# Patient Record
Sex: FEMALE | Race: WHITE | Employment: FULL TIME | URBAN - METROPOLITAN AREA
[De-identification: names, ages, dates, MRNs, and addresses within clinical notes are randomized per-mention and may not be internally consistent; named-entity substitution may affect disease eponyms.]

---

## 2017-01-03 ENCOUNTER — OFFICE VISIT (OUTPATIENT)
Dept: PHYSICAL THERAPY | Age: 47
End: 2017-01-03
Attending: ORTHOPAEDIC SURGERY
Payer: COMMERCIAL

## 2017-01-03 ENCOUNTER — TELEPHONE (OUTPATIENT)
Dept: PHYSICAL THERAPY | Age: 47
End: 2017-01-03

## 2017-01-03 PROCEDURE — 97110 THERAPEUTIC EXERCISES: CPT

## 2017-01-03 NOTE — PROGRESS NOTES
Dx: L ACL repair          Authorized # of Visits:  3        Next MD visit: none scheduled  Fall Risk: standard         Precautions: n/a             Subjective: patient reports less  skin tenderness over the brace.  She reports no more pain in her knee with

## 2017-01-05 ENCOUNTER — APPOINTMENT (OUTPATIENT)
Dept: PHYSICAL THERAPY | Age: 47
End: 2017-01-05
Attending: ORTHOPAEDIC SURGERY
Payer: COMMERCIAL

## 2017-01-05 ENCOUNTER — TELEPHONE (OUTPATIENT)
Dept: INTERNAL MEDICINE CLINIC | Facility: CLINIC | Age: 47
End: 2017-01-05

## 2017-01-05 ENCOUNTER — OFFICE VISIT (OUTPATIENT)
Dept: PHYSICAL THERAPY | Age: 47
End: 2017-01-05
Attending: ORTHOPAEDIC SURGERY
Payer: COMMERCIAL

## 2017-01-05 DIAGNOSIS — R35.0 URINE FREQUENCY: ICD-10-CM

## 2017-01-05 DIAGNOSIS — R30.9 PAINFUL URINATION: Primary | ICD-10-CM

## 2017-01-05 PROCEDURE — 97110 THERAPEUTIC EXERCISES: CPT

## 2017-01-05 NOTE — TELEPHONE ENCOUNTER
Patient stated for 3 To 4 days she has been having urinary frequency, painful but not burning urination, cloudy and foul smelling urine. Recently has ACLS surgery. Denies urgency, blood in the urine, back pain or pubic pressure. LOV 1/12/16.    Patient

## 2017-01-05 NOTE — PROGRESS NOTES
Dx: L ACL repair          Authorized # of Visits:  4        Next MD visit: none scheduled  Fall Risk: standard         Precautions: n/a             Subjective: patient reports that her knee feels stiff today but denies pain .   Pain level: 0/10  Objective:

## 2017-01-05 NOTE — TELEPHONE ENCOUNTER
Pt states that she has a urinary tract infection. Per pt she has pain when urinating and odor in the urine. Pt would like to know if doctor can prescribe a mediation to her or what does doctor recommend.  Pt will wait until she hears back from RN before charis

## 2017-01-06 ENCOUNTER — APPOINTMENT (OUTPATIENT)
Dept: LAB | Age: 47
End: 2017-01-06
Attending: INTERNAL MEDICINE
Payer: COMMERCIAL

## 2017-01-06 DIAGNOSIS — R35.0 URINE FREQUENCY: ICD-10-CM

## 2017-01-06 DIAGNOSIS — R30.9 PAINFUL URINATION: ICD-10-CM

## 2017-01-06 LAB
BILIRUB UR QL: NEGATIVE
COLOR UR: YELLOW
GLUCOSE UR-MCNC: NEGATIVE MG/DL
HGB UR QL STRIP.AUTO: NEGATIVE
NITRITE UR QL STRIP.AUTO: NEGATIVE
PH UR: 5 [PH] (ref 5–8)
PROT UR-MCNC: 30 MG/DL
RBC #/AREA URNS AUTO: 2 /HPF
SP GR UR STRIP: 1.03 (ref 1–1.03)
UROBILINOGEN UR STRIP-ACNC: <2
VIT C UR-MCNC: 40 MG/DL
WBC #/AREA URNS AUTO: 11 /HPF

## 2017-01-06 PROCEDURE — 87086 URINE CULTURE/COLONY COUNT: CPT

## 2017-01-06 PROCEDURE — 87186 SC STD MICRODIL/AGAR DIL: CPT

## 2017-01-06 PROCEDURE — 81001 URINALYSIS AUTO W/SCOPE: CPT

## 2017-01-06 PROCEDURE — 87077 CULTURE AEROBIC IDENTIFY: CPT

## 2017-01-07 RX ORDER — NITROFURANTOIN 25; 75 MG/1; MG/1
100 CAPSULE ORAL 2 TIMES DAILY
Qty: 14 CAPSULE | Refills: 0 | Status: SHIPPED | OUTPATIENT
Start: 2017-01-07 | End: 2017-01-14

## 2017-01-07 NOTE — TELEPHONE ENCOUNTER
Please call pt that u/a is  minimlly  bnormal , culture is pending we can start her on Macrobid 100 ng  Every 12 hours  For 7 days , today later or tomorrow culture  Will be  Resulted if  Antibiotic needs to be changed  We will let her know

## 2017-01-10 ENCOUNTER — OFFICE VISIT (OUTPATIENT)
Dept: PHYSICAL THERAPY | Age: 47
End: 2017-01-10
Attending: ORTHOPAEDIC SURGERY
Payer: COMMERCIAL

## 2017-01-10 PROCEDURE — 97110 THERAPEUTIC EXERCISES: CPT

## 2017-01-10 NOTE — PROGRESS NOTES
Dx: L ACL repair   ( 12/ 21/ 16 )       Authorized # of Visits:  4        Next MD visit: none scheduled  Fall Risk: standard         Precautions: n/a             Subjective: patient reports that her knee feels stiff today but denies pain .   Pain level: 0/1

## 2017-01-12 ENCOUNTER — OFFICE VISIT (OUTPATIENT)
Dept: INTERNAL MEDICINE CLINIC | Facility: CLINIC | Age: 47
End: 2017-01-12

## 2017-01-12 ENCOUNTER — OFFICE VISIT (OUTPATIENT)
Dept: PHYSICAL THERAPY | Age: 47
End: 2017-01-12
Attending: ORTHOPAEDIC SURGERY
Payer: COMMERCIAL

## 2017-01-12 ENCOUNTER — HOSPITAL ENCOUNTER (OUTPATIENT)
Dept: MAMMOGRAPHY | Age: 47
Discharge: HOME | End: 2017-01-12
Attending: INTERNAL MEDICINE
Payer: COMMERCIAL

## 2017-01-12 VITALS
RESPIRATION RATE: 18 BRPM | HEART RATE: 72 BPM | WEIGHT: 220 LBS | TEMPERATURE: 98 F | BODY MASS INDEX: 31.5 KG/M2 | DIASTOLIC BLOOD PRESSURE: 78 MMHG | SYSTOLIC BLOOD PRESSURE: 126 MMHG | HEIGHT: 70 IN

## 2017-01-12 DIAGNOSIS — Z12.31 ENCOUNTER FOR SCREENING MAMMOGRAM FOR MALIGNANT NEOPLASM OF BREAST: ICD-10-CM

## 2017-01-12 DIAGNOSIS — R05.9 COUGH: ICD-10-CM

## 2017-01-12 DIAGNOSIS — S39.012A LUMBAR STRAIN, INITIAL ENCOUNTER: ICD-10-CM

## 2017-01-12 DIAGNOSIS — Z00.00 PHYSICAL EXAM: Primary | ICD-10-CM

## 2017-01-12 PROCEDURE — 97110 THERAPEUTIC EXERCISES: CPT

## 2017-01-12 PROCEDURE — 99396 PREV VISIT EST AGE 40-64: CPT | Performed by: INTERNAL MEDICINE

## 2017-01-12 PROCEDURE — 77067 SCR MAMMO BI INCL CAD: CPT

## 2017-01-12 RX ORDER — METHOCARBAMOL 750 MG/1
750 TABLET, FILM COATED ORAL 3 TIMES DAILY
Qty: 60 TABLET | Refills: 0 | Status: SHIPPED | OUTPATIENT
Start: 2017-01-12 | End: 2017-12-14 | Stop reason: ALTCHOICE

## 2017-01-12 NOTE — PROGRESS NOTES
Dx: L ACL repair   ( 12/ 21/ 16 )       Authorized # of Visits:  5        Next MD visit:  January 24th  Fall Risk: standard         Precautions: n/a             Subjective: Pt reports woke up Wednesday morning with a lot of back soreness.     Pain level: 0/

## 2017-01-13 PROBLEM — S39.012A LUMBAR STRAIN: Status: ACTIVE | Noted: 2017-01-13

## 2017-01-13 NOTE — PROGRESS NOTES
HPI:    Patient ID: Sumaya Granado is a 42year old female presents for physical exam    HPI  Patient reports that she is recovering after left knee reconstructive surgery she tore ACL ligament.   She is undergoing physical therapy and still not working, h External Ointment APPLY TO AFFECTED  AREA  DAILY Disp: 30 g Rfl: 0   fluticasone-salmeterol (ADVAIR DISKUS) 250-50 MCG/DOSE Inhalation Aerosol Powder, Breath Activated Inhale 1 puff into the lungs every 12 (twelve) hours.  Disp:  Rfl:    TRINESSA, 28, 0.18/ motor skills appropriate for age         ASSESSMENT/PLAN:   Physical exam  (primary encounter diagnosis) discussed importance of healthy diet, regular physical activities when she is able to return to, she will check with school and see if she is in need o

## 2017-01-16 ENCOUNTER — OFFICE VISIT (OUTPATIENT)
Dept: PHYSICAL THERAPY | Age: 47
End: 2017-01-16
Attending: ORTHOPAEDIC SURGERY
Payer: COMMERCIAL

## 2017-01-16 PROCEDURE — 97110 THERAPEUTIC EXERCISES: CPT

## 2017-01-17 ENCOUNTER — APPOINTMENT (OUTPATIENT)
Dept: PHYSICAL THERAPY | Age: 47
End: 2017-01-17
Attending: ORTHOPAEDIC SURGERY
Payer: COMMERCIAL

## 2017-01-18 ENCOUNTER — OFFICE VISIT (OUTPATIENT)
Dept: PHYSICAL THERAPY | Age: 47
End: 2017-01-18
Attending: ORTHOPAEDIC SURGERY
Payer: COMMERCIAL

## 2017-01-18 PROCEDURE — 97110 THERAPEUTIC EXERCISES: CPT

## 2017-01-18 NOTE — PROGRESS NOTES
Dx:  L ACL repair   ( 12/ 21/ 16 )       Authorized # of Visits:  6        Next MD visit:  January 24th  Fall Risk: standard         Precautions: n/a             Subjective: Pt reports that she returned back to work this week and her knee feels sore and sti

## 2017-01-19 ENCOUNTER — APPOINTMENT (OUTPATIENT)
Dept: PHYSICAL THERAPY | Age: 47
End: 2017-01-19
Attending: ORTHOPAEDIC SURGERY
Payer: COMMERCIAL

## 2017-01-23 ENCOUNTER — OFFICE VISIT (OUTPATIENT)
Dept: PHYSICAL THERAPY | Age: 47
End: 2017-01-23
Attending: ORTHOPAEDIC SURGERY
Payer: COMMERCIAL

## 2017-01-23 PROCEDURE — 97110 THERAPEUTIC EXERCISES: CPT

## 2017-01-24 ENCOUNTER — OFFICE VISIT (OUTPATIENT)
Dept: ORTHOPEDICS CLINIC | Facility: CLINIC | Age: 47
End: 2017-01-24

## 2017-01-24 DIAGNOSIS — S83.512A LEFT ACL TEAR: Primary | ICD-10-CM

## 2017-01-24 DIAGNOSIS — S83.242A ACUTE MEDIAL MENISCUS TEAR, LEFT, INITIAL ENCOUNTER: ICD-10-CM

## 2017-01-24 DIAGNOSIS — S83.282A ACUTE LATERAL MENISCUS TEAR OF LEFT KNEE, INITIAL ENCOUNTER: ICD-10-CM

## 2017-01-24 PROCEDURE — 99212 OFFICE O/P EST SF 10 MIN: CPT | Performed by: ORTHOPAEDIC SURGERY

## 2017-01-24 PROCEDURE — 99024 POSTOP FOLLOW-UP VISIT: CPT | Performed by: ORTHOPAEDIC SURGERY

## 2017-01-24 NOTE — PROGRESS NOTES
This is a pleasant 44-year-old female that is 4 weeks status post left knee ACL reconstruction, medial meniscus repair and partial lateral meniscectomy. The patient has had no fevers, chills, or markers of infection.   The patient has been participating in

## 2017-01-24 NOTE — PROGRESS NOTES
Patient Name: Leeroy Garcia, : 3/1/1970, MRN: [id number]   Date:  2017  Referring Physician:  Katie Jerry    Diagnosis: Left ACL tear (G66.437C)  S/P left knee arthroscopy (M82.464)  Acute medial meniscus tear, left, initial encounter ( min restrict  Quads: R: NT restrict; L: NT restrict  Gastroc: R: min restrict; L: mod restrict  Soleus: R: min restrict; L: mod restrict    Strength/MMT: **Knee strength NT secondary to post-op  Hip  (in brace) Knee  Foot/Ankle    Flexion: R 5/5; L 4-/5  E stability with stance phase of ambulation-IMPROVED (met with brace)            Rehab Potential: excellent    Plan: Continue skilled Physical Therapy 2 x/week or a total of 10 visits over a 90 day period.  Treatment will include: therapeutic exercises, thera

## 2017-01-26 ENCOUNTER — OFFICE VISIT (OUTPATIENT)
Dept: PHYSICAL THERAPY | Age: 47
End: 2017-01-26
Attending: ORTHOPAEDIC SURGERY
Payer: COMMERCIAL

## 2017-01-26 PROCEDURE — 97110 THERAPEUTIC EXERCISES: CPT

## 2017-01-27 NOTE — PROGRESS NOTES
Dx:  L ACL repair   ( 12/ 21/ 16 )       Authorized # of Visits:  6        Next MD visit:  January 24th  Fall Risk: standard         Precautions: n/a             Subjective: Pt reports saw MD and had brace unlocked, but brace isn't fitting right; keeps fall

## 2017-01-30 ENCOUNTER — OFFICE VISIT (OUTPATIENT)
Dept: PHYSICAL THERAPY | Age: 47
End: 2017-01-30
Attending: ORTHOPAEDIC SURGERY
Payer: COMMERCIAL

## 2017-01-30 PROCEDURE — 97110 THERAPEUTIC EXERCISES: CPT

## 2017-01-31 NOTE — PROGRESS NOTES
Dx: L ACL repair   ( 12/ 21/ 16 )       Authorized # of Visits:  7        Next MD visit:  January 24th  Fall Risk: standard         Precautions: n/a             Subjective: Pt reports talked to MD who said to stop wearing brace since wasn't fitting right.

## 2017-02-02 ENCOUNTER — OFFICE VISIT (OUTPATIENT)
Dept: PHYSICAL THERAPY | Age: 47
End: 2017-02-02
Attending: ORTHOPAEDIC SURGERY
Payer: COMMERCIAL

## 2017-02-02 PROCEDURE — 97110 THERAPEUTIC EXERCISES: CPT

## 2017-02-03 NOTE — PROGRESS NOTES
Dx: L ACL repair   ( 12/ 21/ 16 )       Authorized # of Visits:  8        Next MD visit:  January 24th  Fall Risk: standard         Precautions: n/a             Subjective: Pt reports no pain today.  States knee was stiff getting out of the car; will feel s

## 2017-02-07 ENCOUNTER — APPOINTMENT (OUTPATIENT)
Dept: PHYSICAL THERAPY | Age: 47
End: 2017-02-07
Attending: INTERNAL MEDICINE
Payer: COMMERCIAL

## 2017-02-10 ENCOUNTER — OFFICE VISIT (OUTPATIENT)
Dept: PHYSICAL THERAPY | Age: 47
End: 2017-02-10
Attending: ORTHOPAEDIC SURGERY
Payer: COMMERCIAL

## 2017-02-10 PROCEDURE — 97110 THERAPEUTIC EXERCISES: CPT

## 2017-02-10 NOTE — PROGRESS NOTES
Dx: L ACL repair   ( 12/ 21/ 16 )       Authorized # of Visits:  9        Next MD visit:  January 24th  Fall Risk: standard         Precautions: n/a             Subjective: Pt reports knee feels stiff when straightening it first thing in the morning.  Every

## 2017-02-14 ENCOUNTER — OFFICE VISIT (OUTPATIENT)
Dept: PHYSICAL THERAPY | Age: 47
End: 2017-02-14
Attending: INTERNAL MEDICINE
Payer: COMMERCIAL

## 2017-02-14 PROCEDURE — 97110 THERAPEUTIC EXERCISES: CPT

## 2017-02-15 NOTE — PROGRESS NOTES
Dx:  L ACL repair   ( 12/ 21/ 16 )       Authorized # of Visits:  10        Next MD visit:  January 24th  Fall Risk: standard         Precautions: n/a             Subjective: Pt reports that her knee feels much better overall but sometimes feels weak with a

## 2017-02-16 ENCOUNTER — OFFICE VISIT (OUTPATIENT)
Dept: PHYSICAL THERAPY | Age: 47
End: 2017-02-16
Attending: INTERNAL MEDICINE
Payer: COMMERCIAL

## 2017-02-16 PROCEDURE — 97110 THERAPEUTIC EXERCISES: CPT

## 2017-02-20 ENCOUNTER — OFFICE VISIT (OUTPATIENT)
Dept: PHYSICAL THERAPY | Age: 47
End: 2017-02-20
Attending: INTERNAL MEDICINE
Payer: COMMERCIAL

## 2017-02-20 PROCEDURE — 97110 THERAPEUTIC EXERCISES: CPT

## 2017-02-21 NOTE — PROGRESS NOTES
Dx: L ACL repair   ( 12/ 21/ 16 )       Authorized # of Visits:  12        Next MD visit:  January 24th  Fall Risk: standard         Precautions: n/a             Subjective: Pt reports knee has been feeling a little tight, but otherwise is good.    Pain lev

## 2017-02-23 ENCOUNTER — OFFICE VISIT (OUTPATIENT)
Dept: PHYSICAL THERAPY | Age: 47
End: 2017-02-23
Attending: INTERNAL MEDICINE
Payer: COMMERCIAL

## 2017-02-23 PROCEDURE — 97110 THERAPEUTIC EXERCISES: CPT

## 2017-02-24 NOTE — PROGRESS NOTES
Dx: L ACL repair   ( 12/ 21/ 16 )       Authorized # of Visits:  13/       Next MD visit:  January 24th  Fall Risk: standard         Precautions: n/a             Subjective: Pt reports almost tripped 3 times today over her pants but no injury to knee.  Stat

## 2017-02-27 ENCOUNTER — OFFICE VISIT (OUTPATIENT)
Dept: PHYSICAL THERAPY | Age: 47
End: 2017-02-27
Attending: INTERNAL MEDICINE
Payer: COMMERCIAL

## 2017-02-27 PROCEDURE — 97110 THERAPEUTIC EXERCISES: CPT

## 2017-02-27 NOTE — PROGRESS NOTES
Dx:  L ACL repair   ( 12/ 21/ 16 )       Authorized # of Visits:  18/20 (PPO)      Next MD visit:  January 24th  Fall Risk: standard         Precautions: n/a             Subjective: Pt reports almost tripped over a kid today and felt some ache, but is ok no

## 2017-03-02 ENCOUNTER — OFFICE VISIT (OUTPATIENT)
Dept: PHYSICAL THERAPY | Age: 47
End: 2017-03-02
Attending: INTERNAL MEDICINE
Payer: COMMERCIAL

## 2017-03-02 PROCEDURE — 97110 THERAPEUTIC EXERCISES: CPT

## 2017-03-02 PROCEDURE — 97112 NEUROMUSCULAR REEDUCATION: CPT

## 2017-03-03 NOTE — PROGRESS NOTES
Patient Name: Isabell Barahona, : 3/1/1970, MRN: [id number]   Date:  3/2/2017  Referring Physician: Fernando Nichole    Diagnosis: Left ACL tear (N66.693L)  S/P left knee arthroscopy (A83.765)  Acute medial meniscus tear, left, initial encounter (S83 Flexion: R 5/5; L 4+/5  Extension: R 5/5; L 4/5  Abduction: R 5/5; L 4+/5   Glut Med:  R: 4+/5; L ;4-/5 Flexion: R 5/5; L 4/5  Extension: R 5/5; L 5/5      DF: R 5/5; L 5/5  PF: R 5/5; L 4/5  INV: R 5/5; L 5/5  EV: R 5/5; L 5/5         Balance (firm surf co-sign or sign and return this letter via fax as soon as possible to [phone]. If you have any questions, please contact me at Dept: [phone].     Sincerely,  Alexus Hillman    Electronically signed by therapist: Alexus Hillman, PT,DPT,

## 2017-03-08 ENCOUNTER — OFFICE VISIT (OUTPATIENT)
Dept: PHYSICAL THERAPY | Age: 47
End: 2017-03-08
Attending: INTERNAL MEDICINE
Payer: COMMERCIAL

## 2017-03-08 PROCEDURE — 97110 THERAPEUTIC EXERCISES: CPT

## 2017-03-08 NOTE — PROGRESS NOTES
Dx:  L ACL repair   ( 12/ 21/ 16 )       Authorized # of Visits:  20/27 (PPO)      Next MD visit: none scheduled  Fall Risk: standard         Precautions: n/a             Subjective: Pt reports L leg has been tired, she believes from doing stairs more often

## 2017-03-13 ENCOUNTER — OFFICE VISIT (OUTPATIENT)
Dept: PHYSICAL THERAPY | Age: 47
End: 2017-03-13
Attending: INTERNAL MEDICINE
Payer: COMMERCIAL

## 2017-03-13 PROCEDURE — 97110 THERAPEUTIC EXERCISES: CPT

## 2017-03-13 NOTE — PROGRESS NOTES
Dx: L ACL repair   ( 12/ 21/ 16 )       Authorized # of Visits:  21/27 (PPO)      Next MD visit: none scheduled  Fall Risk: standard         Precautions: n/a             Subjective: Pt reports still feels like knee is unstable when she walks sometimes.  Has

## 2017-03-16 ENCOUNTER — OFFICE VISIT (OUTPATIENT)
Dept: PHYSICAL THERAPY | Age: 47
End: 2017-03-16
Attending: INTERNAL MEDICINE
Payer: COMMERCIAL

## 2017-03-16 PROCEDURE — 97110 THERAPEUTIC EXERCISES: CPT

## 2017-03-16 NOTE — PROGRESS NOTES
Dx: L ACL repair   ( 12/ 21/ 16 )       Authorized # of Visits:  22/27 (PPO)      Next MD visit: none scheduled  Fall Risk: standard         Precautions: n/a             Subjective: Pt reports that her knee feels tired and denies any pain today.   Pain leve

## 2017-03-21 ENCOUNTER — OFFICE VISIT (OUTPATIENT)
Dept: PHYSICAL THERAPY | Age: 47
End: 2017-03-21
Attending: INTERNAL MEDICINE
Payer: COMMERCIAL

## 2017-03-21 PROCEDURE — 97110 THERAPEUTIC EXERCISES: CPT

## 2017-03-21 NOTE — PROGRESS NOTES
Dx:  L ACL repair   ( 12/ 21/ 16 )       Authorized # of Visits:  23/27 (PPO)      Next MD visit: none scheduled  Fall Risk: standard         Precautions: n/a             Subjective: Pt reports that she still has some difficulties with stairs negotiation du

## 2017-03-23 ENCOUNTER — OFFICE VISIT (OUTPATIENT)
Dept: PHYSICAL THERAPY | Age: 47
End: 2017-03-23
Attending: INTERNAL MEDICINE
Payer: COMMERCIAL

## 2017-03-23 PROCEDURE — 97110 THERAPEUTIC EXERCISES: CPT

## 2017-03-23 NOTE — PROGRESS NOTES
Dx: L ACL repair   ( 12/ 21/ 16 )       Authorized # of Visits:  24/27 (PPO)      Next MD visit: none scheduled  Fall Risk: standard         Precautions: n/a             Subjective: Pt reports stairs are intermittently ok.  Sometimes she will feel ok going

## 2017-03-27 ENCOUNTER — OFFICE VISIT (OUTPATIENT)
Dept: PHYSICAL THERAPY | Age: 47
End: 2017-03-27
Attending: INTERNAL MEDICINE
Payer: COMMERCIAL

## 2017-03-27 PROCEDURE — 97110 THERAPEUTIC EXERCISES: CPT

## 2017-03-27 NOTE — PROGRESS NOTES
Dx: L ACL repair   ( 12/ 21/ 16 )       Authorized # of Visits:  25/27 (PPO)      Next MD visit: none scheduled  Fall Risk: standard         Precautions: n/a             Subjective: Pt reports reports knee feeling good.  Feels sore with activity, but \"good

## 2017-03-30 ENCOUNTER — OFFICE VISIT (OUTPATIENT)
Dept: PHYSICAL THERAPY | Age: 47
End: 2017-03-30
Attending: INTERNAL MEDICINE
Payer: COMMERCIAL

## 2017-03-30 PROCEDURE — 97110 THERAPEUTIC EXERCISES: CPT

## 2017-03-30 NOTE — PROGRESS NOTES
Patient Name: Cullen June, : 3/1/1970, MRN: [id number]   Date:  3/30/2017  Referring Physician:  Billie Hughes    Diagnosis: L ACL repair   (  )     Progress Summary    Pt has attended 25 visits in Physical Therapy.      Progress No Treatment:  Recumbent bike lvl 3 x5mins  Supine: SLR with 3# 3x10x5\"hold L  Sidely SLR with ER 3x10x5\"hold  Shuttle: B leg press 8B 3x10  Shuttle L leg press 5B 3x10  Sidesteps GTB 2 laps ea dir  MW GTB 2 laps ea dir  SL heel raise off edge of step 3x10 [phone].     Sincerely,  Anastasiia Harrison    Electronically signed by therapist: Anastasiia Harrison, PT,DPT,BSPTS [address] [facility]    Physician's certification required: Yes  I certify the need for these services furnished under this plan of treatmen

## 2017-04-03 ENCOUNTER — APPOINTMENT (OUTPATIENT)
Dept: PHYSICAL THERAPY | Age: 47
End: 2017-04-03
Attending: INTERNAL MEDICINE
Payer: COMMERCIAL

## 2017-04-06 ENCOUNTER — OFFICE VISIT (OUTPATIENT)
Dept: PHYSICAL THERAPY | Age: 47
End: 2017-04-06
Attending: INTERNAL MEDICINE
Payer: COMMERCIAL

## 2017-04-06 PROCEDURE — 97110 THERAPEUTIC EXERCISES: CPT

## 2017-04-06 NOTE — PROGRESS NOTES
Dx:  L ACL repair   ( 12/ 21/ 16 )       Authorized # of Visits:  26/34 (PPO)      Next MD visit: none scheduled  Fall Risk: standard         Precautions: n/a             Subjective: Pt reports right leg was tight so was forced to use left leg a lot more fo

## 2017-04-10 ENCOUNTER — APPOINTMENT (OUTPATIENT)
Dept: PHYSICAL THERAPY | Age: 47
End: 2017-04-10
Attending: INTERNAL MEDICINE
Payer: COMMERCIAL

## 2017-04-11 ENCOUNTER — OFFICE VISIT (OUTPATIENT)
Dept: PHYSICAL THERAPY | Age: 47
End: 2017-04-11
Attending: INTERNAL MEDICINE
Payer: COMMERCIAL

## 2017-04-11 ENCOUNTER — OFFICE VISIT (OUTPATIENT)
Dept: ORTHOPEDICS CLINIC | Facility: CLINIC | Age: 47
End: 2017-04-11

## 2017-04-11 DIAGNOSIS — S83.512A LEFT ACL TEAR: Primary | ICD-10-CM

## 2017-04-11 DIAGNOSIS — S83.282A ACUTE LATERAL MENISCUS TEAR OF LEFT KNEE, INITIAL ENCOUNTER: ICD-10-CM

## 2017-04-11 DIAGNOSIS — S83.242A ACUTE MEDIAL MENISCUS TEAR, LEFT, INITIAL ENCOUNTER: ICD-10-CM

## 2017-04-11 PROCEDURE — 99213 OFFICE O/P EST LOW 20 MIN: CPT | Performed by: ORTHOPAEDIC SURGERY

## 2017-04-11 PROCEDURE — 99212 OFFICE O/P EST SF 10 MIN: CPT | Performed by: ORTHOPAEDIC SURGERY

## 2017-04-11 PROCEDURE — 97110 THERAPEUTIC EXERCISES: CPT

## 2017-04-11 NOTE — PROGRESS NOTES
Dx: L ACL repair   ( 12/ 21/ 16 )       Authorized # of Visits:  27/34 (PPO)      Next MD visit: none scheduled  Fall Risk: standard         Precautions: n/a             Subjective: Pt reports felt sore after last session, but not bad.  Has been focusing on

## 2017-04-11 NOTE — PROGRESS NOTES
4/11/2017  Mohit Cleora Both  11/1970  41year old   female  Ajith Faye MD    HPI:   Patient presents with:  Post-Op: s/p left ACL -- Going to PT. Denies pain, numbness and tingling. Denies calf pain, calf swelling or fever.  States going up and colette status post left knee ACL reconstruction, medial meniscus repair, and partial lateral meniscectomy. The patient will continue physical therapy. The patient will follow-up in 2 months for repeat evaluation.   The patient was given information on how to obt

## 2017-04-13 ENCOUNTER — APPOINTMENT (OUTPATIENT)
Dept: PHYSICAL THERAPY | Age: 47
End: 2017-04-13
Attending: INTERNAL MEDICINE
Payer: COMMERCIAL

## 2017-04-13 ENCOUNTER — OFFICE VISIT (OUTPATIENT)
Dept: PHYSICAL THERAPY | Age: 47
End: 2017-04-13
Attending: INTERNAL MEDICINE
Payer: COMMERCIAL

## 2017-04-13 PROCEDURE — 97110 THERAPEUTIC EXERCISES: CPT

## 2017-04-13 NOTE — PROGRESS NOTES
Dx: L ACL repair   ( 12/ 21/ 16 )       Authorized # of Visits:  28/34 (PPO)      Next MD visit: none scheduled  Fall Risk: standard         Precautions: n/a             Subjective: Pt reports knee feels ok.  Saw MD and he prescribed soft brace for when she

## 2017-04-19 ENCOUNTER — APPOINTMENT (OUTPATIENT)
Dept: PHYSICAL THERAPY | Age: 47
End: 2017-04-19
Attending: INTERNAL MEDICINE
Payer: COMMERCIAL

## 2017-04-27 ENCOUNTER — OFFICE VISIT (OUTPATIENT)
Dept: PHYSICAL THERAPY | Age: 47
End: 2017-04-27
Attending: INTERNAL MEDICINE
Payer: COMMERCIAL

## 2017-04-27 PROCEDURE — 97110 THERAPEUTIC EXERCISES: CPT

## 2017-04-27 NOTE — PROGRESS NOTES
Dx: L ACL repair   ( 12/ 21/ 16 )       Authorized # of Visits:  29/34 (PPO)      Next MD visit: June  Fall Risk: standard         Precautions: n/a             Subjective: Pt reports bought BOS[employer] and has been using it for balance exercises.  Was able to walk

## 2017-05-04 ENCOUNTER — OFFICE VISIT (OUTPATIENT)
Dept: PHYSICAL THERAPY | Age: 47
End: 2017-05-04
Attending: INTERNAL MEDICINE
Payer: COMMERCIAL

## 2017-05-04 PROCEDURE — 97110 THERAPEUTIC EXERCISES: CPT

## 2017-05-04 NOTE — PROGRESS NOTES
Dx: L ACL repair   ( 12/ 21/ 16 )       Authorized # of Visits:  30/34 (PPO)      Next MD visit: June  Fall Risk: standard         Precautions: n/a             Subjective: Pt reports no new c/o  And denies any pain today.   Pain level: 0/10   Objective:

## 2017-05-11 ENCOUNTER — OFFICE VISIT (OUTPATIENT)
Dept: PHYSICAL THERAPY | Age: 47
End: 2017-05-11
Attending: INTERNAL MEDICINE
Payer: COMMERCIAL

## 2017-05-11 PROCEDURE — 97110 THERAPEUTIC EXERCISES: CPT

## 2017-05-11 NOTE — PROGRESS NOTES
Dx: L ACL repair   ( 12/ 21/ 16 )       Authorized # of Visits:  31/34 (PPO)      Next MD visit: June  Fall Risk: standard         Precautions: n/a             Subjective: Pt reports  That she still has some difficulties with going down the stairs.    Pain

## 2017-05-18 ENCOUNTER — OFFICE VISIT (OUTPATIENT)
Dept: PHYSICAL THERAPY | Age: 47
End: 2017-05-18
Attending: INTERNAL MEDICINE
Payer: COMMERCIAL

## 2017-05-18 PROCEDURE — 97110 THERAPEUTIC EXERCISES: CPT

## 2017-05-18 NOTE — PROGRESS NOTES
Patient Name: Ed Viramontes, : 3/1/1970, MRN: [id number]   Date:  2017  Referring Physician:  Ilya Gonsalez    Diagnosis: L ACL repair   (  )    Progress Summary    Pt has attended 32 visits in Physical Therapy.      Progress Not 5/5  Extension: R 5/5; L 5/5*        DF: R 5/5; L 5/5  PF: R 5/5; L 4+/5  INV: R 5/5; L 5/5  EV: R 5/5; L 5/5                                                 Today's Treatment:  Recumbent bike lvl 4 x 5 mins  Transfer floor <> stand focus on push-off  Fwd please contact me at Dept: [phone].     Sincerely,  Anastasiia Harrison    Electronically signed by therapist: Anastasiia Harrison, PT,ENRRIQUE,Felicity Cert    [de-identified] certification required: Yes  I certify the need for these services furnished under

## 2017-05-25 ENCOUNTER — OFFICE VISIT (OUTPATIENT)
Dept: PHYSICAL THERAPY | Age: 47
End: 2017-05-25
Attending: INTERNAL MEDICINE
Payer: COMMERCIAL

## 2017-05-25 PROCEDURE — 97110 THERAPEUTIC EXERCISES: CPT

## 2017-05-25 NOTE — PROGRESS NOTES
Dx: L ACL repair   ( 12/ 21/ 16 )       Authorized # of Visits:  33/40 (PPO)      Next MD visit: June  Fall Risk: standard         Precautions: n/a             Subjective: Pt reports no longer using \"rolling chair\" at work.  Is able to sit in small chair

## 2017-06-01 ENCOUNTER — OFFICE VISIT (OUTPATIENT)
Dept: PHYSICAL THERAPY | Age: 47
End: 2017-06-01
Attending: INTERNAL MEDICINE
Payer: COMMERCIAL

## 2017-06-01 PROCEDURE — 97110 THERAPEUTIC EXERCISES: CPT

## 2017-06-01 NOTE — PROGRESS NOTES
Dx: L ACL repair   ( 12/ 21/ 16 )       Authorized # of Visits:  34/40 (PPO)      Next MD visit: June  Fall Risk: standard         Precautions: n/a             Subjective: Pt reports knee feeling much better.  Is able to sit on stool at work and in kids' ch

## 2017-06-08 ENCOUNTER — OFFICE VISIT (OUTPATIENT)
Dept: PHYSICAL THERAPY | Age: 47
End: 2017-06-08
Attending: INTERNAL MEDICINE
Payer: COMMERCIAL

## 2017-06-08 PROCEDURE — 97110 THERAPEUTIC EXERCISES: CPT

## 2017-06-08 NOTE — PROGRESS NOTES
Dx: L ACL repair   ( 12/ 21/ 16 )       Authorized # of Visits:  35/40 (PPO)      Next MD visit:   Fall Risk: standard         Precautions: n/a             Subjective: Pt reports noticed a \"bump\" on incision this morning.  States looks better now, but not

## 2017-06-14 ENCOUNTER — TELEPHONE (OUTPATIENT)
Dept: PHYSICAL THERAPY | Age: 47
End: 2017-06-14

## 2017-06-14 NOTE — TELEPHONE ENCOUNTER
Pt cancelled last 2 scheduled appts on mychart. Called pt to discuss whether she would like to be discharged or reschedule appts. Left vm asking pt to return call.

## 2017-06-15 ENCOUNTER — APPOINTMENT (OUTPATIENT)
Dept: PHYSICAL THERAPY | Age: 47
End: 2017-06-15
Attending: INTERNAL MEDICINE
Payer: COMMERCIAL

## 2017-06-20 ENCOUNTER — TELEPHONE (OUTPATIENT)
Dept: INTERNAL MEDICINE CLINIC | Facility: CLINIC | Age: 47
End: 2017-06-20

## 2017-06-20 NOTE — TELEPHONE ENCOUNTER
NEEDS A LIST OF HER MEDICATIONS SHE IS TAKING AND THE LIST MUST INCLUDE DOCTOR'S SIGNATURE  ( SHE IS TRAVELING TO [place] AND THEY WILL NOT ALLOW ANY DRUGS INTO THERE COUNTRY WITH A DOCTOR'S NOTE )  SHE NEEDS THIS BEFORE Friday

## 2017-06-22 ENCOUNTER — APPOINTMENT (OUTPATIENT)
Dept: PHYSICAL THERAPY | Age: 47
End: 2017-06-22
Attending: INTERNAL MEDICINE
Payer: COMMERCIAL

## 2017-06-22 RX ORDER — NORGESTIMATE-ETHINYL ESTRADIOL 7DAYSX3 28
TABLET ORAL
Qty: 84 TABLET | Refills: 0 | Status: SHIPPED | OUTPATIENT
Start: 2017-06-22 | End: 2018-07-31

## 2017-06-22 NOTE — TELEPHONE ENCOUNTER
Spoke with pt. Informed letter is ready for p/u at [facility]. Pt. Will  today. Pt. Verbalized understanding.

## 2017-12-14 ENCOUNTER — HOSPITAL ENCOUNTER (OUTPATIENT)
Age: 47
Discharge: HOME | End: 2017-12-14
Payer: COMMERCIAL

## 2017-12-14 VITALS
HEIGHT: 70 IN | WEIGHT: 220 LBS | DIASTOLIC BLOOD PRESSURE: 58 MMHG | BODY MASS INDEX: 31.5 KG/M2 | RESPIRATION RATE: 18 BRPM | OXYGEN SATURATION: 99 % | SYSTOLIC BLOOD PRESSURE: 123 MMHG | HEART RATE: 86 BPM | TEMPERATURE: 98 F

## 2017-12-14 DIAGNOSIS — J02.0 STREPTOCOCCAL SORE THROAT: Primary | ICD-10-CM

## 2017-12-14 DIAGNOSIS — J45.909 ACUTE ASTHMA: ICD-10-CM

## 2017-12-14 PROCEDURE — 99204 OFFICE O/P NEW MOD 45 MIN: CPT

## 2017-12-14 PROCEDURE — 94640 AIRWAY INHALATION TREATMENT: CPT

## 2017-12-14 PROCEDURE — 87430 STREP A AG IA: CPT

## 2017-12-14 PROCEDURE — 99214 OFFICE O/P EST MOD 30 MIN: CPT

## 2017-12-14 RX ORDER — PREDNISONE 20 MG/1
40 TABLET ORAL DAILY
Qty: 10 TABLET | Refills: 0 | Status: SHIPPED | OUTPATIENT
Start: 2017-12-14 | End: 2017-12-19

## 2017-12-14 RX ORDER — AMOXICILLIN 875 MG/1
875 TABLET, COATED ORAL 2 TIMES DAILY
Qty: 20 TABLET | Refills: 0 | Status: SHIPPED | OUTPATIENT
Start: 2017-12-14 | End: 2017-12-24

## 2017-12-14 RX ORDER — ALBUTEROL SULFATE 90 UG/1
2 AEROSOL, METERED RESPIRATORY (INHALATION) EVERY 4 HOURS PRN
Qty: 1 INHALER | Refills: 0 | Status: SHIPPED | OUTPATIENT
Start: 2017-12-14 | End: 2018-01-13

## 2017-12-14 RX ORDER — PREDNISONE 20 MG/1
40 TABLET ORAL ONCE
Status: COMPLETED | OUTPATIENT
Start: 2017-12-14 | End: 2017-12-14

## 2017-12-14 RX ORDER — IPRATROPIUM BROMIDE AND ALBUTEROL SULFATE 2.5; .5 MG/3ML; MG/3ML
3 SOLUTION RESPIRATORY (INHALATION) ONCE
Status: COMPLETED | OUTPATIENT
Start: 2017-12-14 | End: 2017-12-14

## 2017-12-14 RX ORDER — FLUTICASONE PROPIONATE AND SALMETEROL 250; 50 UG/1; UG/1
1 POWDER RESPIRATORY (INHALATION) 2 TIMES DAILY
Qty: 1 PACKAGE | Refills: 0 | Status: SHIPPED | OUTPATIENT
Start: 2017-12-14 | End: 2018-01-13

## 2017-12-15 NOTE — ED PROVIDER NOTES
Patient presents with:  Cough/URI      HPI:     Ricky Silva is a 41year old female who presents for evaluation of a chief complaint of dry cough and sore throat for the past 2-3 days.   The patient states she was diagnosed with asthma approximately 3 y stated complaint asthma, sore throat, dry cough  Other systems reviewed and are negative. Constitutional and Vital Signs Reviewed.     Physical Exam:      /58   Pulse 86   Temp 98.2 °F (36.8 °C) (Oral)   Resp 18   Ht 177.8 cm (5' 10\")   Wt 99.8 kg (from the past 10 hour(s))  -[facility] POCT RAPID STREP   Collection Time: 12/14/17  7:23 PM   Result Value Ref Range   POCT Rapid Strep Positive (A) Negative       MDM:  The rapid strep test is positive. I will treat with amoxicillin.   The patient received a D

## 2017-12-26 ENCOUNTER — OFFICE VISIT (OUTPATIENT)
Dept: INTERNAL MEDICINE CLINIC | Facility: CLINIC | Age: 47
End: 2017-12-26

## 2017-12-26 VITALS
DIASTOLIC BLOOD PRESSURE: 69 MMHG | TEMPERATURE: 97 F | WEIGHT: 227 LBS | RESPIRATION RATE: 22 BRPM | HEART RATE: 87 BPM | BODY MASS INDEX: 33 KG/M2 | SYSTOLIC BLOOD PRESSURE: 114 MMHG

## 2017-12-26 DIAGNOSIS — J45.31 MILD PERSISTENT ASTHMATIC BRONCHITIS WITH ACUTE EXACERBATION: Primary | ICD-10-CM

## 2017-12-26 PROCEDURE — 99214 OFFICE O/P EST MOD 30 MIN: CPT | Performed by: INTERNAL MEDICINE

## 2017-12-26 PROCEDURE — 99212 OFFICE O/P EST SF 10 MIN: CPT | Performed by: INTERNAL MEDICINE

## 2017-12-26 RX ORDER — MONTELUKAST SODIUM 10 MG/1
10 TABLET ORAL NIGHTLY
Qty: 30 TABLET | Refills: 0 | Status: SHIPPED | OUTPATIENT
Start: 2017-12-26 | End: 2018-01-15

## 2017-12-26 RX ORDER — PREDNISONE 10 MG/1
TABLET ORAL
Qty: 24 TABLET | Refills: 0 | Status: SHIPPED | OUTPATIENT
Start: 2017-12-26 | End: 2018-01-03

## 2017-12-26 RX ORDER — FLUTICASONE PROPIONATE 50 MCG
2 SPRAY, SUSPENSION (ML) NASAL DAILY
Qty: 3 BOTTLE | Refills: 0 | Status: SHIPPED | OUTPATIENT
Start: 2017-12-26 | End: 2018-01-15

## 2017-12-26 RX ORDER — AZITHROMYCIN 250 MG/1
TABLET, FILM COATED ORAL
Qty: 6 TABLET | Refills: 0 | Status: SHIPPED | OUTPATIENT
Start: 2017-12-26 | End: 2017-12-30

## 2017-12-26 RX ORDER — BENZONATATE 200 MG/1
200 CAPSULE ORAL 3 TIMES DAILY PRN
Qty: 30 CAPSULE | Refills: 0 | Status: SHIPPED | OUTPATIENT
Start: 2017-12-26 | End: 2018-01-15

## 2017-12-29 NOTE — PROGRESS NOTES
HPI:    Patient ID: Cristine Fernandes is a 41year old female. Presents for evaluation of the cough.     [facility] 2 weeks ago, diagnosed with bronchitis, treated with amoxicillin and prednisone for 5 days, she states that prednisone did not help her Inhalation Aerosol Powder, Breath Activated Inhale 1 puff into the lungs 2 (two) times daily.  Disp: 1 Package Rfl: 0   TRINESSA, 28, 0.18/0.215/0.25 MG-35 MCG Oral Tab TAKE 1 TABLET BY MOUTH EVERY DAY Disp: 84 tablet Rfl: 0     Allergies:No Known Allergies 3 days, thenTake 30 mg (3 tabs) by mouth once daily for 2 days, thenTake 20 mg (2 tabs) by mouth once daily for 2 days, thenTake 10 mg (1 tab) by mouth once daily for 2 days      azithromycin 250 MG Oral Tab 6 tablet 0      Sig: Take 2 tablets by mouth on

## 2018-01-05 ENCOUNTER — NURSE TRIAGE (OUTPATIENT)
Dept: OTHER | Age: 48
End: 2018-01-05

## 2018-01-05 NOTE — TELEPHONE ENCOUNTER
Action Requested: Summary for Provider     []  Critical Lab, Recommendations Needed  [] Need Additional Advice  []   FYI    []   Need Orders  [x] Need Medications Sent to Pharmacy  []  Other     SUMMARY: Dr. Asmita Edward for [place] pt stated that she was in

## 2018-01-05 NOTE — TELEPHONE ENCOUNTER
Noted she just finished antibiotics 2 weeks ago --pls ask her to go to immediate care  for eval prior to flight --ty

## 2018-01-07 ENCOUNTER — HOSPITAL ENCOUNTER (OUTPATIENT)
Age: 48
Discharge: HOME | End: 2018-01-07
Attending: FAMILY MEDICINE
Payer: COMMERCIAL

## 2018-01-07 VITALS
HEIGHT: 70 IN | TEMPERATURE: 97 F | BODY MASS INDEX: 31.5 KG/M2 | HEART RATE: 71 BPM | WEIGHT: 220 LBS | RESPIRATION RATE: 16 BRPM | OXYGEN SATURATION: 100 % | SYSTOLIC BLOOD PRESSURE: 109 MMHG | DIASTOLIC BLOOD PRESSURE: 58 MMHG

## 2018-01-07 DIAGNOSIS — H60.331 ACUTE SWIMMER'S EAR OF RIGHT SIDE: Primary | ICD-10-CM

## 2018-01-07 PROCEDURE — 99214 OFFICE O/P EST MOD 30 MIN: CPT

## 2018-01-07 PROCEDURE — 99213 OFFICE O/P EST LOW 20 MIN: CPT

## 2018-01-07 RX ORDER — CIPROFLOXACIN AND DEXAMETHASONE 3; 1 MG/ML; MG/ML
4 SUSPENSION/ DROPS AURICULAR (OTIC) 2 TIMES DAILY
Qty: 5 ML | Refills: 1 | Status: SHIPPED | OUTPATIENT
Start: 2018-01-07 | End: 2018-01-15

## 2018-01-07 NOTE — ED PROVIDER NOTES
Patient Seen in: [facility] [place] History   CC:  Patient presents with:  Ear Problem    Stated Complaint: Right ear pain    ------------------------------  Per Rn:   PATIENT ARRIVED AMBULATORY TO ROOM C/O RIGHT EAR PAIN.  LIOR air)    Current:/58   Pulse 71   Temp (!) 97.4 °F (36.3 °C) (Oral)   Resp 16   Ht 177.8 cm (5' 10\")   Wt 99.8 kg   LMP 12/18/2017 (Exact Date)   SpO2 100%   BMI 31.57 kg/m²         General Appearance:    Alert, cooperative, no distress, appears stat

## 2018-01-07 NOTE — ED INITIAL ASSESSMENT (HPI)
PATIENT ARRIVED AMBULATORY TO ROOM C/O RIGHT EAR PAIN. PAIN STARTED 4 DAYS AGO PROGRESSIVELY WORSENING.  PATIENT STATES \"IN December I HAD DENTAL WORK DONE ON THE RIGHT LOWER SIDE OF MY MOUTH SO I'M ALSO NOT SURE IF ITS MY JAW THAT IS CAUSING THIS\" JOSHUA

## 2018-01-08 NOTE — TELEPHONE ENCOUNTER
Pt was seen in [address] [place] [address] yesterday, prescribed Ciprodex drops.   ELIZA Tay.

## 2018-01-15 ENCOUNTER — OFFICE VISIT (OUTPATIENT)
Dept: INTERNAL MEDICINE CLINIC | Facility: CLINIC | Age: 48
End: 2018-01-15

## 2018-01-15 VITALS
TEMPERATURE: 99 F | DIASTOLIC BLOOD PRESSURE: 61 MMHG | RESPIRATION RATE: 21 BRPM | WEIGHT: 231 LBS | HEART RATE: 85 BPM | BODY MASS INDEX: 33 KG/M2 | SYSTOLIC BLOOD PRESSURE: 98 MMHG

## 2018-01-15 DIAGNOSIS — H60.591 OTHER NONINFECTIVE ACUTE OTITIS EXTERNA OF RIGHT EAR: Primary | ICD-10-CM

## 2018-01-15 PROCEDURE — 99212 OFFICE O/P EST SF 10 MIN: CPT | Performed by: INTERNAL MEDICINE

## 2018-01-15 PROCEDURE — 99213 OFFICE O/P EST LOW 20 MIN: CPT | Performed by: INTERNAL MEDICINE

## 2018-01-15 RX ORDER — FLUTICASONE PROPIONATE AND SALMETEROL 50; 250 UG/1; UG/1
POWDER RESPIRATORY (INHALATION)
COMMUNITY
Start: 2017-12-14 | End: 2019-12-23

## 2018-01-15 RX ORDER — CEFPROZIL 500 MG/1
500 TABLET, FILM COATED ORAL 2 TIMES DAILY
Qty: 14 TABLET | Refills: 0 | Status: SHIPPED | OUTPATIENT
Start: 2018-01-15 | End: 2018-05-27 | Stop reason: ALTCHOICE

## 2018-01-15 NOTE — PROGRESS NOTES
HPI:    Patient ID: Chato Madrid is a 41year old female. Presents for evaluation of the ear ache.     HPI  Patient was seen at [facility] center 8 days ago for evaluation of right ear ache, she was diagnosed with external otitis given drops Ciprode rate, regular rhythm and normal heart sounds. No murmur heard. Edema not present. Pulmonary/Chest: Effort normal and breath sounds normal. No respiratory distress. She has no wheezes.    Neurological: She is alert and oriented to person, place, and ti

## 2018-02-06 ENCOUNTER — TELEPHONE (OUTPATIENT)
Dept: OTHER | Age: 48
End: 2018-02-06

## 2018-02-06 NOTE — TELEPHONE ENCOUNTER
Dr. Elisha Sutton: please advise on mammogram order. Spouse called and  would like to schedule patient for her annual mammogram.      LOV 1/15/18;   Last mammogram screening done 1/12/2017    Please respond to pool: DEJA ACOSTA LPN/MERLYN

## 2018-02-07 ENCOUNTER — TELEPHONE (OUTPATIENT)
Dept: INTERNAL MEDICINE CLINIC | Facility: CLINIC | Age: 48
End: 2018-02-07

## 2018-02-07 DIAGNOSIS — Z12.31 BREAST CANCER SCREENING BY MAMMOGRAM: Primary | ICD-10-CM

## 2018-02-19 ENCOUNTER — HOSPITAL ENCOUNTER (OUTPATIENT)
Dept: MAMMOGRAPHY | Age: 48
Discharge: HOME | End: 2018-02-19
Attending: INTERNAL MEDICINE
Payer: COMMERCIAL

## 2018-02-19 DIAGNOSIS — Z12.31 BREAST CANCER SCREENING BY MAMMOGRAM: ICD-10-CM

## 2018-02-19 PROCEDURE — 77067 SCR MAMMO BI INCL CAD: CPT | Performed by: INTERNAL MEDICINE

## 2018-05-27 ENCOUNTER — HOSPITAL ENCOUNTER (OUTPATIENT)
Age: 48
Discharge: HOME | End: 2018-05-27
Payer: COMMERCIAL

## 2018-05-27 VITALS
BODY MASS INDEX: 31.5 KG/M2 | OXYGEN SATURATION: 99 % | HEIGHT: 70 IN | HEART RATE: 104 BPM | WEIGHT: 220 LBS | RESPIRATION RATE: 16 BRPM | DIASTOLIC BLOOD PRESSURE: 62 MMHG | SYSTOLIC BLOOD PRESSURE: 150 MMHG | TEMPERATURE: 98 F

## 2018-05-27 DIAGNOSIS — J40 BRONCHITIS: Primary | ICD-10-CM

## 2018-05-27 DIAGNOSIS — S61.213A LACERATION OF LEFT MIDDLE FINGER WITHOUT FOREIGN BODY WITHOUT DAMAGE TO NAIL, INITIAL ENCOUNTER: ICD-10-CM

## 2018-05-27 PROCEDURE — 99213 OFFICE O/P EST LOW 20 MIN: CPT

## 2018-05-27 PROCEDURE — 99214 OFFICE O/P EST MOD 30 MIN: CPT

## 2018-05-27 RX ORDER — PREDNISONE 20 MG/1
40 TABLET ORAL DAILY
Qty: 10 TABLET | Refills: 0 | Status: SHIPPED | OUTPATIENT
Start: 2018-05-27 | End: 2018-06-01

## 2018-05-27 RX ORDER — CEFDINIR 300 MG/1
300 CAPSULE ORAL 2 TIMES DAILY
Qty: 20 CAPSULE | Refills: 0 | Status: SHIPPED | OUTPATIENT
Start: 2018-05-27 | End: 2018-06-06

## 2018-05-27 RX ORDER — BENZONATATE 100 MG/1
200 CAPSULE ORAL 3 TIMES DAILY PRN
Qty: 30 CAPSULE | Refills: 0 | Status: SHIPPED | OUTPATIENT
Start: 2018-05-27 | End: 2019-12-23 | Stop reason: ALTCHOICE

## 2018-05-27 NOTE — ED PROVIDER NOTES
Patient presents with:  Cough/URI      HPI:     Johan Monroe is a 57year old female who presents for evaluation and management of a chief complaint of cough without respiratory distress for the past 10 days.   The patient states the cough is at times pr History Narrative   None on file        ROS:   Positive for stated complaint: finger laceration, cough, congestion  All other systems reviewed and negative except as noted above. Constitutional and Vital Signs Reviewed.     Physical Exam:     Findings: visit (from the past 10 hour(s)). MDM:   I will treat the patient for bronchitis with Cefdinir, Prednisone, and Tessalon Perles. I spoke with her how it is too late to suture the laceration on her left middle digit.   She will continue to keep it clean

## 2018-05-27 NOTE — ED INITIAL ASSESSMENT (HPI)
Pt with cough for greater than one week. States she had been getting better but cough getting worse now. Pt with hx of asthma and seasonal allergies. Denies fever.

## 2018-05-29 ENCOUNTER — HOSPITAL ENCOUNTER (EMERGENCY)
Facility: HOSPITAL | Age: 48
Discharge: HOME | End: 2018-05-30
Attending: EMERGENCY MEDICINE
Payer: COMMERCIAL

## 2018-05-29 DIAGNOSIS — J40 BRONCHITIS: Primary | ICD-10-CM

## 2018-05-29 PROCEDURE — 99283 EMERGENCY DEPT VISIT LOW MDM: CPT

## 2018-05-30 ENCOUNTER — APPOINTMENT (OUTPATIENT)
Dept: GENERAL RADIOLOGY | Facility: HOSPITAL | Age: 48
End: 2018-05-30
Attending: EMERGENCY MEDICINE
Payer: COMMERCIAL

## 2018-05-30 VITALS
SYSTOLIC BLOOD PRESSURE: 105 MMHG | HEART RATE: 88 BPM | OXYGEN SATURATION: 96 % | WEIGHT: 220.44 LBS | BODY MASS INDEX: 31.56 KG/M2 | DIASTOLIC BLOOD PRESSURE: 67 MMHG | HEIGHT: 70 IN | RESPIRATION RATE: 18 BRPM | TEMPERATURE: 98 F

## 2018-05-30 PROCEDURE — 71045 X-RAY EXAM CHEST 1 VIEW: CPT | Performed by: EMERGENCY MEDICINE

## 2018-05-30 PROCEDURE — 94640 AIRWAY INHALATION TREATMENT: CPT

## 2018-05-30 RX ORDER — IPRATROPIUM BROMIDE AND ALBUTEROL SULFATE 2.5; .5 MG/3ML; MG/3ML
3 SOLUTION RESPIRATORY (INHALATION) ONCE
Status: COMPLETED | OUTPATIENT
Start: 2018-05-30 | End: 2018-05-30

## 2018-05-30 NOTE — ED PROVIDER NOTES
Patient Seen in: [facility] Emergency Department    History   No chief complaint on file. HPI    Patient presents to the ED complaining of a dry cough for the past several days.   She states today her chest feels tighter and she feels slightly Social History and Family History elements reviewed from today, pertinent positives to the presenting problem noted.     Physical Exam     ED Triage Vitals [05/29/18 2336]  BP: 116/74  Pulse: 98  Resp: 22  Temp: 98.2 °F (36.8 °C)  Temp src: n/a  SpO2: 9 Medications   ipratropium-albuterol (DUONEB) nebulizer solution 3 mL (3 mL Nebulization Given 5/30/18 0043)         MDM      05/29/18  2336 05/30/18  0043 05/30/18  0100 05/30/18  0102   BP: 116/74  105/67 105/67   Pulse: 98  88 88   Resp: 22 18 18   Te Prescribed:  Discharge Medication List as of 5/30/2018  1:02 AM    START taking these medications    Ipratropium-Albuterol  MCG/ACT Inhalation Aero Soln  Inhale 2 puffs into the lungs every 4 (four) hours as needed. , Print Script, Disp-1 Inhaler, R-0

## 2018-05-30 NOTE — ED INITIAL ASSESSMENT (HPI)
States she has had a cough XC several days. States she was seen at urgent care Sunday but is not improving despite neds prescribed. States she has a hx asthma.  No C/O N/V or chest pain

## 2018-07-31 ENCOUNTER — OFFICE VISIT (OUTPATIENT)
Dept: INTERNAL MEDICINE CLINIC | Facility: CLINIC | Age: 48
End: 2018-07-31
Payer: COMMERCIAL

## 2018-07-31 VITALS
RESPIRATION RATE: 21 BRPM | HEIGHT: 70 IN | WEIGHT: 235 LBS | DIASTOLIC BLOOD PRESSURE: 67 MMHG | BODY MASS INDEX: 33.64 KG/M2 | HEART RATE: 86 BPM | TEMPERATURE: 98 F | SYSTOLIC BLOOD PRESSURE: 109 MMHG | OXYGEN SATURATION: 97 %

## 2018-07-31 DIAGNOSIS — N76.1 SUBACUTE VAGINITIS: ICD-10-CM

## 2018-07-31 DIAGNOSIS — Z00.00 PHYSICAL EXAM, ANNUAL: Primary | ICD-10-CM

## 2018-07-31 DIAGNOSIS — Z01.419 ENCOUNTER FOR WELL WOMAN EXAM WITH ROUTINE GYNECOLOGICAL EXAM: ICD-10-CM

## 2018-07-31 DIAGNOSIS — N39.498 OTHER URINARY INCONTINENCE: ICD-10-CM

## 2018-07-31 DIAGNOSIS — Z01.419 PAP TEST, AS PART OF ROUTINE GYNECOLOGICAL EXAMINATION: ICD-10-CM

## 2018-07-31 PROCEDURE — 99396 PREV VISIT EST AGE 40-64: CPT | Performed by: INTERNAL MEDICINE

## 2018-07-31 NOTE — PROGRESS NOTES
HPI:    Patient ID: Tru Nur is a 57year old female.   Presents for a physical exam    HPI  Patient reports that overall she is doing well, she is bothered by intermittent pain in the right acutely tender in the area, leg may feel stiff, she does st (four) hours as needed. Disp: 1 Inhaler Rfl: 0   ADVAIR DISKUS 250-50 MCG/DOSE Inhalation Aerosol Powder, Breath Activated Use twice a day Disp:  Rfl:    benzonatate 100 MG Oral Cap Take 2 capsules (200 mg total) by mouth 3 (three) times daily as needed.  D appropriate for age         ASSESSMENT/PLAN:   Physical exam, annual  (primary encounter diagnosis)  Other urinary incontinence      Orders Placed This Encounter      CBC W Differential W Platelet [E]      Comp Metabolic Panel (14) [E]      Lipid Panel [E]

## 2018-08-01 LAB — HPV I/H RISK 1 DNA SPEC QL NAA+PROBE: NEGATIVE

## 2018-08-02 PROBLEM — S39.012A LUMBAR STRAIN: Status: RESOLVED | Noted: 2017-01-13 | Resolved: 2018-08-02

## 2018-08-02 LAB
GENITAL VAGINOSIS SCREEN: NEGATIVE
LAST PAP RESULT: NORMAL
TRICHOMONAS SCREEN: NEGATIVE

## 2019-04-02 NOTE — MR AVS SNAPSHOT
[address]  [address] [facility] [address]  [phone]  [phone]               Thank you for choosing us for your health care visit with Bret Rodriguez PT.   We are glad to serve you and happy to provide you with this s Ben in [facility] [address] ([address] [facility],[address])    [address] [place] [address]           Please arrive at your scheduled appointment time. Wear comfortable, loose fitting clothing.             Mar 21, 2017  6:15 PM last six months

## 2019-05-22 ENCOUNTER — NURSE TRIAGE (OUTPATIENT)
Dept: OTHER | Age: 49
End: 2019-05-22

## 2019-05-22 NOTE — TELEPHONE ENCOUNTER
Action Requested: Summary for Provider     []  Critical Lab, Recommendations Needed  [] Need Additional Advice  []   FYI    []   Need Orders  [] Need Medications Sent to Pharmacy  []  Other     SUMMARY: Per pt requesting an appt for migraines.  Pt stats she

## 2019-05-25 ENCOUNTER — LAB ENCOUNTER (OUTPATIENT)
Dept: LAB | Age: 49
End: 2019-05-25
Attending: INTERNAL MEDICINE
Payer: COMMERCIAL

## 2019-05-25 DIAGNOSIS — Z00.00 PHYSICAL EXAM, ANNUAL: ICD-10-CM

## 2019-05-25 PROCEDURE — 80053 COMPREHEN METABOLIC PANEL: CPT

## 2019-05-25 PROCEDURE — 80061 LIPID PANEL: CPT

## 2019-05-25 PROCEDURE — 85025 COMPLETE CBC W/AUTO DIFF WBC: CPT

## 2019-05-25 PROCEDURE — 36415 COLL VENOUS BLD VENIPUNCTURE: CPT

## 2019-05-25 PROCEDURE — 84443 ASSAY THYROID STIM HORMONE: CPT

## 2019-05-29 ENCOUNTER — OFFICE VISIT (OUTPATIENT)
Dept: INTERNAL MEDICINE CLINIC | Facility: CLINIC | Age: 49
End: 2019-05-29
Payer: COMMERCIAL

## 2019-05-29 VITALS
BODY MASS INDEX: 30.35 KG/M2 | DIASTOLIC BLOOD PRESSURE: 66 MMHG | HEIGHT: 70 IN | HEART RATE: 75 BPM | TEMPERATURE: 98 F | SYSTOLIC BLOOD PRESSURE: 103 MMHG | WEIGHT: 212 LBS

## 2019-05-29 DIAGNOSIS — R51.9 HEADACHE DISORDER: Primary | ICD-10-CM

## 2019-05-29 PROCEDURE — 99212 OFFICE O/P EST SF 10 MIN: CPT | Performed by: INTERNAL MEDICINE

## 2019-05-29 PROCEDURE — 99214 OFFICE O/P EST MOD 30 MIN: CPT | Performed by: INTERNAL MEDICINE

## 2019-05-29 RX ORDER — TOPIRAMATE 25 MG/1
TABLET ORAL
Qty: 84 TABLET | Refills: 0 | Status: SHIPPED | OUTPATIENT
Start: 2019-05-29 | End: 2020-06-23

## 2019-05-29 RX ORDER — ONDANSETRON 4 MG/1
4 TABLET, FILM COATED ORAL EVERY 8 HOURS PRN
Qty: 30 TABLET | Refills: 0 | Status: SHIPPED | OUTPATIENT
Start: 2019-05-29 | End: 2020-06-23

## 2019-06-02 PROBLEM — Z00.00 PHYSICAL EXAM: Status: RESOLVED | Noted: 2017-01-12 | Resolved: 2019-06-02

## 2019-06-02 PROBLEM — Z86.69 HISTORY OF MIGRAINE HEADACHES: Status: ACTIVE | Noted: 2019-06-02

## 2019-06-02 NOTE — PROGRESS NOTES
HPI:    Patient ID: Delmi Oakes Both is a 54year old female.   Presents for evaluate patient of the headaches    HPI  Patient reports that all her life she has been suffering from the headaches that can involve bitemporal area occipital area could be in Inhalation Aero Soln Inhale 2 puffs into the lungs every 4 (four) hours as needed. Disp: 1 Inhaler Rfl: 0   benzonatate 100 MG Oral Cap Take 2 capsules (200 mg total) by mouth 3 (three) times daily as needed.  Disp: 30 capsule Rfl: 0   ADVAIR DISKUS 250-50 neurologist    No orders of the defined types were placed in this encounter.       Meds This Visit:  Requested Prescriptions     Signed Prescriptions Disp Refills   • Ondansetron HCl (ZOFRAN) 4 mg tablet 30 tablet 0     Sig: Take 1 tablet (4 mg total) by mo

## 2019-11-17 ENCOUNTER — APPOINTMENT (OUTPATIENT)
Dept: GENERAL RADIOLOGY | Age: 49
End: 2019-11-17
Attending: NURSE PRACTITIONER
Payer: COMMERCIAL

## 2019-11-17 ENCOUNTER — HOSPITAL ENCOUNTER (OUTPATIENT)
Age: 49
Discharge: HOME | End: 2019-11-17
Payer: COMMERCIAL

## 2019-11-17 VITALS
RESPIRATION RATE: 20 BRPM | OXYGEN SATURATION: 100 % | WEIGHT: 208 LBS | HEART RATE: 79 BPM | BODY MASS INDEX: 29.78 KG/M2 | TEMPERATURE: 98 F | DIASTOLIC BLOOD PRESSURE: 40 MMHG | SYSTOLIC BLOOD PRESSURE: 122 MMHG | HEIGHT: 70 IN

## 2019-11-17 DIAGNOSIS — S20.212A CONTUSION OF RIB ON LEFT SIDE, INITIAL ENCOUNTER: Primary | ICD-10-CM
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
## 2019-11-17 PROCEDURE — 99214 OFFICE O/P EST MOD 30 MIN: CPT

## 2019-11-17 PROCEDURE — 99213 OFFICE O/P EST LOW 20 MIN: CPT

## 2019-11-17 PROCEDURE — 81002 URINALYSIS NONAUTO W/O SCOPE: CPT

## 2019-11-17 PROCEDURE — 71101 X-RAY EXAM UNILAT RIBS/CHEST: CPT | Performed by: NURSE PRACTITIONER

## 2019-11-17 RX ORDER — CYCLOBENZAPRINE HCL 10 MG
10 TABLET ORAL 3 TIMES DAILY PRN
Qty: 20 TABLET | Refills: 0 | Status: SHIPPED | OUTPATIENT
Start: 2019-11-17 | End: 2019-11-24

## 2019-11-17 NOTE — ED INITIAL ASSESSMENT (HPI)
Left sided pain s/p tripped on a hamper and fell last Friday, denies loc, no neck pain, no bruising noted

## 2019-11-17 NOTE — ED PROVIDER NOTES
Patient presents with:  Contusion (musculoskeletal)      HPI:     Ayana Low is a 54year old female who presents for evaluation and management of a chief complaint of left lateral rib pain for the past couple days after a fall.  She was walking th Worry: Not on file        Inability: Not on file      Transportation needs:        Medical: Not on file        Non-medical: Not on file    Tobacco Use      Smoking status: Never Smoker      Smokeless tobacco: Never Used    Substance and Sexual Activit wheezes. Left rib discomfort with deep breathing, movement and palpation. No CVA tenderness. No abdominal tenderness. No bruising. NEURO: No focal deficits.      MDM/Assessment/Plan:   Orders for this encounter:  Orders Placed This Encounter      XR RIBS W rib with some muscle discomfort. I will prescribe her Flexeril and she will continue taking ibuprofen. I will recommend heat to the area and gentle stretching exercises. I will also recommend frequent deep breathing.   She will follow-up closely with her

## 2019-12-23 ENCOUNTER — OFFICE VISIT (OUTPATIENT)
Dept: INTERNAL MEDICINE CLINIC | Facility: CLINIC | Age: 49
End: 2019-12-23
Payer: COMMERCIAL

## 2019-12-23 ENCOUNTER — NURSE TRIAGE (OUTPATIENT)
Dept: INTERNAL MEDICINE CLINIC | Facility: CLINIC | Age: 49
End: 2019-12-23

## 2019-12-23 VITALS
TEMPERATURE: 97 F | DIASTOLIC BLOOD PRESSURE: 70 MMHG | SYSTOLIC BLOOD PRESSURE: 104 MMHG | WEIGHT: 212 LBS | OXYGEN SATURATION: 97 % | BODY MASS INDEX: 30.35 KG/M2 | HEIGHT: 70 IN | HEART RATE: 85 BPM

## 2019-12-23 DIAGNOSIS — J45.20 MILD INTERMITTENT ASTHMA WITHOUT COMPLICATION: Primary | ICD-10-CM

## 2019-12-23 PROCEDURE — 99213 OFFICE O/P EST LOW 20 MIN: CPT | Performed by: INTERNAL MEDICINE

## 2019-12-23 RX ORDER — FLUTICASONE PROPIONATE AND SALMETEROL 50; 250 UG/1; UG/1
1 POWDER RESPIRATORY (INHALATION) EVERY 12 HOURS SCHEDULED
Qty: 3 PACKAGE | Refills: 3 | Status: SHIPPED | OUTPATIENT
Start: 2019-12-23 | End: 2020-01-12

## 2019-12-23 RX ORDER — BENZONATATE 100 MG/1
100 CAPSULE ORAL 3 TIMES DAILY PRN
Qty: 30 CAPSULE | Refills: 1 | Status: SHIPPED | OUTPATIENT
Start: 2019-12-23 | End: 2020-06-23

## 2019-12-23 RX ORDER — PREDNISONE 20 MG/1
40 TABLET ORAL DAILY
Qty: 10 TABLET | Refills: 0 | Status: SHIPPED | OUTPATIENT
Start: 2019-12-23 | End: 2019-12-28

## 2019-12-23 NOTE — PROGRESS NOTES
Patient ID: Kike Cohn Both is a 54year old female. Patient presents with:  Cough: Cough x1 week asthma, SOB       HISTORY OF PRESENT ILLNESS:   HPI  Patient presents for above. Here with cough x1 week. No fevers or chills.   Several sick contacts (eight) hours as needed for Nausea., Disp: 30 tablet, Rfl: 0  •  topiramate 25 MG Oral Tab, Take  1tab po  At bedtime for 7 days, take  1 tab po twice aday for 7 days, take  1 tb po in am and 2 tab at pmfor 7 days , take  2 tab po twice a day (Patient not Body mass index is 30.42 kg/m². Physical Exam   Constitutional: She appears well-developed and well-nourished. Cardiovascular: Normal rate, regular rhythm and normal heart sounds.    Pulmonary/Chest: She has decreased breath sounds in the right u
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
## 2019-12-24 ENCOUNTER — TELEPHONE (OUTPATIENT)
Dept: CARDIOLOGY CLINIC | Facility: CLINIC | Age: 49
End: 2019-12-24

## 2019-12-27 ENCOUNTER — OFFICE VISIT (OUTPATIENT)
Dept: INTERNAL MEDICINE CLINIC | Facility: CLINIC | Age: 49
End: 2019-12-27
Payer: COMMERCIAL

## 2019-12-27 ENCOUNTER — HOSPITAL ENCOUNTER (OUTPATIENT)
Dept: GENERAL RADIOLOGY | Age: 49
Discharge: HOME | End: 2019-12-27
Attending: INTERNAL MEDICINE
Payer: COMMERCIAL

## 2019-12-27 ENCOUNTER — TELEPHONE (OUTPATIENT)
Dept: OTHER | Age: 49
End: 2019-12-27

## 2019-12-27 VITALS
HEART RATE: 104 BPM | SYSTOLIC BLOOD PRESSURE: 107 MMHG | DIASTOLIC BLOOD PRESSURE: 73 MMHG | WEIGHT: 214 LBS | TEMPERATURE: 98 F | HEIGHT: 70 IN | BODY MASS INDEX: 30.64 KG/M2 | OXYGEN SATURATION: 97 %

## 2019-12-27 DIAGNOSIS — J45.20 MILD INTERMITTENT ASTHMA WITHOUT COMPLICATION: ICD-10-CM

## 2019-12-27 DIAGNOSIS — R05.9 COUGH: Primary | ICD-10-CM

## 2019-12-27 DIAGNOSIS — R05.9 COUGH: ICD-10-CM

## 2019-12-27 PROCEDURE — 99213 OFFICE O/P EST LOW 20 MIN: CPT | Performed by: INTERNAL MEDICINE

## 2019-12-27 PROCEDURE — 71046 X-RAY EXAM CHEST 2 VIEWS: CPT | Performed by: INTERNAL MEDICINE

## 2019-12-27 RX ORDER — PREDNISONE 10 MG/1
10 TABLET ORAL DAILY
Qty: 30 TABLET | Refills: 0 | Status: SHIPPED
Start: 2019-12-27 | End: 2020-06-23

## 2019-12-27 RX ORDER — CODEINE PHOSPHATE AND GUAIFENESIN 10; 100 MG/5ML; MG/5ML
5 SOLUTION ORAL EVERY 6 HOURS PRN
Qty: 180 ML | Refills: 0 | Status: SHIPPED | OUTPATIENT
Start: 2019-12-27 | End: 2020-06-23

## 2019-12-27 NOTE — PROGRESS NOTES
Patient ID: Katy Pavon Both is a 54year old female. Patient presents with: Follow - Up: Still having cough with no relief. Incontinence due cough       HISTORY OF PRESENT ILLNESS:   HPI  Patient presents for above.   Here for follow-up of shortness needed. , Disp: 1 Inhaler, Rfl: 0  •  ADVAIR DISKUS 250-50 MCG/DOSE Inhalation Aerosol Powder, Breath Activated, Inhale 1 puff into the lungs every 12 (twelve) hours.  Use twice a day, Disp: 3 Package, Rfl: 3  •  predniSONE 20 MG Oral Tab, Take 2 tablets (40 Relationship status: Not on file      Intimate partner violence:        Fear of current or ex partner: Not on file        Emotionally abused: Not on file        Physically abused: Not on file        Forced sexual activity: Not on file    Other Topics

## 2019-12-27 NOTE — TELEPHONE ENCOUNTER
Pt states she was seen in the office 12/23/19 and given prescriptions for Prednisone, Advair, Ipratropium-Albuterol inhaler and Benzonatate. Pt states she is using the Ipratropium-Albuterol every four hours. States she still feels really tight.  Pt also james

## 2020-01-06 ENCOUNTER — TELEPHONE (OUTPATIENT)
Dept: INTERNAL MEDICINE CLINIC | Facility: CLINIC | Age: 50
End: 2020-01-06

## 2020-01-06 DIAGNOSIS — J45.20 MILD INTERMITTENT ASTHMA WITHOUT COMPLICATION: Primary | ICD-10-CM

## 2020-01-06 DIAGNOSIS — R05.9 COUGH: ICD-10-CM

## 2020-01-06 NOTE — TELEPHONE ENCOUNTER
Pt state the chest tightness and cough is a little better but its still there. Denies SOB. Please advise regarding plan

## 2020-01-06 NOTE — TELEPHONE ENCOUNTER
Patient called in stating that she has 2 days of medication left and is still feeling the tightness in her chest (from asthma) and still has her cough.      She was told by Dr. Ross Tapia if her symptoms persist that he was going to refer her to a pulmonologist.

## 2020-01-22 ENCOUNTER — OFFICE VISIT (OUTPATIENT)
Dept: PULMONOLOGY | Facility: CLINIC | Age: 50
End: 2020-01-22
Payer: COMMERCIAL

## 2020-01-22 VITALS
BODY MASS INDEX: 30.49 KG/M2 | WEIGHT: 213 LBS | OXYGEN SATURATION: 99 % | RESPIRATION RATE: 18 BRPM | SYSTOLIC BLOOD PRESSURE: 117 MMHG | DIASTOLIC BLOOD PRESSURE: 71 MMHG | HEART RATE: 76 BPM | HEIGHT: 70 IN

## 2020-01-22 DIAGNOSIS — R05.9 COUGH: Primary | ICD-10-CM

## 2020-01-22 DIAGNOSIS — J45.30 MILD PERSISTENT ASTHMA WITHOUT COMPLICATION: ICD-10-CM

## 2020-01-22 PROCEDURE — 99244 OFF/OP CNSLTJ NEW/EST MOD 40: CPT | Performed by: INTERNAL MEDICINE

## 2020-01-22 NOTE — PROGRESS NOTES
[facility], [facility], [facility]    Report of Consultation    Juan Carlos Gabriel Both Patient Status:  Outpatient    3/1/1970 MRN [id number]   Location [facility], [facility] Mother    • Hypertension Mother    • Arthritis Mother 67        cervical arthritis   • Breast Cancer Mother 79        lumpectomy and radiation   • Breast Cancer Paternal Grandmother 50   • Dementia Paternal Grandfather         Alzheimer's Disease   • Skin Imaging:  CXR : clear            Impression:       1– chronic dry cough which is likely upper airway cough syndrome  - possible vocal cord dysfunction cough   -Habitual cough  - ? reactive airway / asthma   - r/o subtle ILD or bronchiectasis ( less lik

## 2020-01-31 ENCOUNTER — HOSPITAL ENCOUNTER (OUTPATIENT)
Dept: CT IMAGING | Facility: HOSPITAL | Age: 50
Discharge: HOME | End: 2020-01-31
Attending: INTERNAL MEDICINE
Payer: COMMERCIAL

## 2020-01-31 DIAGNOSIS — R05.9 COUGH: ICD-10-CM

## 2020-01-31 PROCEDURE — 71250 CT THORAX DX C-: CPT | Performed by: INTERNAL MEDICINE

## 2020-02-05 ENCOUNTER — HOSPITAL ENCOUNTER (OUTPATIENT)
Dept: RESPIRATORY THERAPY | Facility: HOSPITAL | Age: 50
Discharge: HOME | End: 2020-02-05
Attending: INTERNAL MEDICINE
Payer: COMMERCIAL

## 2020-02-05 DIAGNOSIS — J45.30 MILD PERSISTENT ASTHMA WITHOUT COMPLICATION: ICD-10-CM

## 2020-02-05 DIAGNOSIS — R05.9 COUGH: ICD-10-CM

## 2020-02-05 PROCEDURE — 94060 EVALUATION OF WHEEZING: CPT | Performed by: INTERNAL MEDICINE

## 2020-02-05 PROCEDURE — 94729 DIFFUSING CAPACITY: CPT | Performed by: INTERNAL MEDICINE

## 2020-02-05 PROCEDURE — 94726 PLETHYSMOGRAPHY LUNG VOLUMES: CPT | Performed by: INTERNAL MEDICINE

## 2020-02-05 NOTE — PROCEDURES
[facility]     Pulmonary Function Test     Margorie Hatchet Both Patient Status:  Outpatient    3/1/1970 MRN [id number]   Date of Exam 20 PCP Manpreet Mora MD           Spirometry   FEV1: 2.91 84%  FVC: 3.52 81%  FEV1/FVC: 0.83    Lung

## 2020-03-20 RX ORDER — FLUTICASONE PROPIONATE AND SALMETEROL 500; 50 UG/1; UG/1
POWDER RESPIRATORY (INHALATION)
Qty: 60 EACH | Refills: 0 | Status: SHIPPED | OUTPATIENT
Start: 2020-03-20 | End: 2020-04-15

## 2020-04-02 ENCOUNTER — TELEPHONE (OUTPATIENT)
Dept: PULMONOLOGY | Facility: CLINIC | Age: 50
End: 2020-04-02

## 2020-04-15 RX ORDER — FLUTICASONE PROPIONATE AND SALMETEROL 50; 500 UG/1; UG/1
POWDER RESPIRATORY (INHALATION)
Qty: 60 EACH | Refills: 0 | Status: SHIPPED | OUTPATIENT
Start: 2020-04-15 | End: 2021-03-03

## 2020-06-23 ENCOUNTER — OFFICE VISIT (OUTPATIENT)
Dept: INTERNAL MEDICINE CLINIC | Facility: CLINIC | Age: 50
End: 2020-06-23
Payer: COMMERCIAL

## 2020-06-23 VITALS
DIASTOLIC BLOOD PRESSURE: 68 MMHG | HEART RATE: 85 BPM | SYSTOLIC BLOOD PRESSURE: 108 MMHG | BODY MASS INDEX: 32 KG/M2 | RESPIRATION RATE: 21 BRPM | WEIGHT: 225 LBS

## 2020-06-23 DIAGNOSIS — M25.572 ACUTE LEFT ANKLE PAIN: ICD-10-CM

## 2020-06-23 DIAGNOSIS — Z00.00 PHYSICAL EXAM, ANNUAL: Primary | ICD-10-CM

## 2020-06-23 DIAGNOSIS — Z12.31 BREAST CANCER SCREENING BY MAMMOGRAM: ICD-10-CM

## 2020-06-23 DIAGNOSIS — J45.20 MILD INTERMITTENT ASTHMA WITHOUT COMPLICATION: ICD-10-CM

## 2020-06-23 PROCEDURE — 99396 PREV VISIT EST AGE 40-64: CPT | Performed by: INTERNAL MEDICINE

## 2020-06-23 RX ORDER — TACROLIMUS 0.3 MG/G
OINTMENT TOPICAL
Qty: 30 G | Refills: 0 | Status: SHIPPED | OUTPATIENT
Start: 2020-06-23 | End: 2020-06-24

## 2020-06-23 RX ORDER — MONTELUKAST SODIUM 10 MG/1
10 TABLET ORAL NIGHTLY
Qty: 90 TABLET | Refills: 0 | Status: SHIPPED | OUTPATIENT
Start: 2020-06-23

## 2020-06-23 RX ORDER — FLUTICASONE PROPIONATE AND SALMETEROL 250; 50 UG/1; UG/1
1 POWDER RESPIRATORY (INHALATION) EVERY 12 HOURS SCHEDULED
Qty: 1 PACKAGE | Refills: 1 | Status: SHIPPED | OUTPATIENT
Start: 2020-06-23

## 2020-06-24 ENCOUNTER — TELEPHONE (OUTPATIENT)
Dept: INTERNAL MEDICINE CLINIC | Facility: CLINIC | Age: 50
End: 2020-06-24

## 2020-06-24 RX ORDER — TACROLIMUS 0.3 MG/G
OINTMENT TOPICAL
Qty: 30 G | Refills: 0 | Status: SHIPPED | OUTPATIENT
Start: 2020-06-24

## 2020-06-24 NOTE — PROGRESS NOTES
HPI:    Patient ID: Dana Smith Both is a 52year old female. Presents for physical exam    HPI  Patient reports that overall she has been feeling well, since January she has been taking Advair 500 inhaler that completely resolved cough.   She underwent for bone/joint symptoms  Neurological:  Negative for gait disturbance, paresthesias  Psychiatric:  Negative for inappropriate interaction and psychiatric symptoms        Current Outpatient Medications   Medication Sig Dispense Refill   • fluticasone-salmet no guarding. Lymphadenopathy:     She has no cervical adenopathy. Neurological: She is alert and oriented to person, place, and time. No cranial nerve deficit or motor deficit. Gait normal.   Skin: Skin is warm and dry. No lesion and no rash noted.    P

## 2020-07-02 ENCOUNTER — HOSPITAL ENCOUNTER (OUTPATIENT)
Dept: MAMMOGRAPHY | Age: 50
Discharge: HOME | End: 2020-07-02
Attending: INTERNAL MEDICINE
Payer: COMMERCIAL

## 2020-07-02 DIAGNOSIS — Z12.31 BREAST CANCER SCREENING BY MAMMOGRAM: ICD-10-CM

## 2020-07-02 PROCEDURE — 77067 SCR MAMMO BI INCL CAD: CPT | Performed by: INTERNAL MEDICINE

## 2020-07-02 PROCEDURE — 77063 BREAST TOMOSYNTHESIS BI: CPT | Performed by: INTERNAL MEDICINE

## 2020-08-21 ENCOUNTER — OFFICE VISIT (OUTPATIENT)
Dept: OTOLARYNGOLOGY | Facility: CLINIC | Age: 50
End: 2020-08-21
Payer: COMMERCIAL

## 2020-08-21 ENCOUNTER — OFFICE VISIT (OUTPATIENT)
Dept: AUDIOLOGY | Facility: CLINIC | Age: 50
End: 2020-08-21
Payer: COMMERCIAL

## 2020-08-21 ENCOUNTER — OFFICE VISIT (OUTPATIENT)
Dept: INTERNAL MEDICINE CLINIC | Facility: CLINIC | Age: 50
End: 2020-08-21
Payer: COMMERCIAL

## 2020-08-21 VITALS
DIASTOLIC BLOOD PRESSURE: 74 MMHG | SYSTOLIC BLOOD PRESSURE: 112 MMHG | HEIGHT: 70 IN | WEIGHT: 220.88 LBS | BODY MASS INDEX: 31.62 KG/M2 | TEMPERATURE: 98 F

## 2020-08-21 VITALS
SYSTOLIC BLOOD PRESSURE: 102 MMHG | HEIGHT: 70 IN | HEART RATE: 80 BPM | BODY MASS INDEX: 31.61 KG/M2 | WEIGHT: 220.81 LBS | DIASTOLIC BLOOD PRESSURE: 70 MMHG | RESPIRATION RATE: 18 BRPM

## 2020-08-21 DIAGNOSIS — H91.93 BILATERAL HEARING LOSS, UNSPECIFIED HEARING LOSS TYPE: Primary | ICD-10-CM

## 2020-08-21 DIAGNOSIS — H90.3 ASYMMETRICAL SENSORINEURAL HEARING LOSS: Primary | ICD-10-CM

## 2020-08-21 DIAGNOSIS — H60.8X3 CHRONIC ECZEMATOUS OTITIS EXTERNA OF BOTH EARS: ICD-10-CM

## 2020-08-21 DIAGNOSIS — H60.543 ECZEMA OF BOTH EXTERNAL EARS: ICD-10-CM

## 2020-08-21 DIAGNOSIS — IMO0001 ASYMMETRICAL HEARING LOSS OF LEFT EAR: Primary | ICD-10-CM

## 2020-08-21 PROCEDURE — 3008F BODY MASS INDEX DOCD: CPT | Performed by: INTERNAL MEDICINE

## 2020-08-21 PROCEDURE — 3074F SYST BP LT 130 MM HG: CPT | Performed by: INTERNAL MEDICINE

## 2020-08-21 PROCEDURE — 3008F BODY MASS INDEX DOCD: CPT | Performed by: OTOLARYNGOLOGY

## 2020-08-21 PROCEDURE — 3078F DIAST BP <80 MM HG: CPT | Performed by: OTOLARYNGOLOGY

## 2020-08-21 PROCEDURE — 92557 COMPREHENSIVE HEARING TEST: CPT | Performed by: AUDIOLOGIST

## 2020-08-21 PROCEDURE — 3074F SYST BP LT 130 MM HG: CPT | Performed by: OTOLARYNGOLOGY

## 2020-08-21 PROCEDURE — 92567 TYMPANOMETRY: CPT | Performed by: AUDIOLOGIST

## 2020-08-21 PROCEDURE — 99214 OFFICE O/P EST MOD 30 MIN: CPT | Performed by: INTERNAL MEDICINE

## 2020-08-21 PROCEDURE — 3078F DIAST BP <80 MM HG: CPT | Performed by: INTERNAL MEDICINE

## 2020-08-21 PROCEDURE — 99243 OFF/OP CNSLTJ NEW/EST LOW 30: CPT | Performed by: OTOLARYNGOLOGY

## 2020-08-21 NOTE — PROGRESS NOTES
History of Present Illness   Patient ID: Bautista Moder Both is a 52year old female.   Chief Complaint: Ear Problem (x 1 week- bilateral ear, feeling sore and tender, family has mentioned she is talking louder )      Ear Problem    There is pain in both ea Cardiovascular: Normal rate and normal heart sounds. Pulmonary/Chest: Effort normal. No respiratory distress. Abdominal: Soft. Neurological: She is alert and oriented to person, place, and time. No cranial nerve deficit.    Skin: Skin is warm and dry Mild intermittent asthma without complication      History of migraine headaches      Left ACL tear      Encounter for screening mammogram for malignant neoplasm of breast      Myalgia      Primary osteoarthritis involving multiple joints      Arthropath 2. Eczema of both external ears  - ENT - INTERNAL  Plan  Exam unremarkable aside from erythema of the cymba/opeing of ear canal, no obvious difficulties with hearing.   We will have her continue tacrolimus to the external auditory canal we will have her fol

## 2020-08-21 NOTE — PROGRESS NOTES
Tiffanie Low is a 52year old female. Patient presents with:  Hearing Loss: decreased hearing in both ears for 4 months    HPI:   For several years she has had problems with itching in her ears. She is not having any pain or drainage.   She has been Details   Skin Normal Inspection - Normal.   Constitutional Normal Overall appearance - Normal.   Head/Face Normal Facial features - Normal. Eyebrows - Normal. Skull - Normal.   Oral/Oropharynx Normal Lips - Normal, Tonsils - Normal, Tongue - Normal    Manfred

## 2020-08-25 NOTE — PROGRESS NOTES
AUDIOLOGY REPORT      Jimmy Low is a 52year old female     Referring Provider: Magui Jaeger   YOB: 1970  Medical Record: [id number]      Patient Hearing History:   Patient reported a question of hearing loss.        [facility]

## 2020-12-14 ENCOUNTER — OFFICE VISIT (OUTPATIENT)
Dept: INTERNAL MEDICINE CLINIC | Facility: CLINIC | Age: 50
End: 2020-12-14
Payer: COMMERCIAL

## 2020-12-14 VITALS
DIASTOLIC BLOOD PRESSURE: 69 MMHG | WEIGHT: 227 LBS | HEART RATE: 90 BPM | BODY MASS INDEX: 33 KG/M2 | RESPIRATION RATE: 22 BRPM | SYSTOLIC BLOOD PRESSURE: 104 MMHG

## 2020-12-14 DIAGNOSIS — L91.8 SKIN TAG: Primary | ICD-10-CM

## 2020-12-14 DIAGNOSIS — J45.20 MILD INTERMITTENT ASTHMA WITHOUT COMPLICATION: ICD-10-CM

## 2020-12-14 PROCEDURE — 3078F DIAST BP <80 MM HG: CPT | Performed by: INTERNAL MEDICINE

## 2020-12-14 PROCEDURE — 3074F SYST BP LT 130 MM HG: CPT | Performed by: INTERNAL MEDICINE

## 2020-12-14 PROCEDURE — 99213 OFFICE O/P EST LOW 20 MIN: CPT | Performed by: INTERNAL MEDICINE

## 2020-12-15 NOTE — PROGRESS NOTES
HPI:    Patient ID: Michelle Manley Both is a 52year old female.   Presents for evaluation of the growth and armpits, asthma follow-up    HPI  Patient reports that she has grown flake skin tag in the right axillary area, it could be irritated, at times can adult)   Pulse 90   Resp 22   Wt 227 lb (103 kg)   BMI 32.57 kg/m²    Physical Exam    Constitutional: She is oriented to person, place, and time and thin. She appears well-developed and well-nourished. HENT:   Head: Normocephalic and atraumatic.    Right

## 2021-01-11 ENCOUNTER — PATIENT MESSAGE (OUTPATIENT)
Dept: INTERNAL MEDICINE CLINIC | Facility: CLINIC | Age: 51
End: 2021-01-11

## 2021-01-11 NOTE — TELEPHONE ENCOUNTER
From: Juan Pablo Mauricio Both  To: Doroteo Denise MD  Sent: 1/11/2021 8:20 AM CST  Subject: Referral Request    Good morning Dr. Rufus Orona,    I am writing because I am getting more concerned about going back to school to work with my asthma and honestly, my weigh

## 2021-01-13 NOTE — TELEPHONE ENCOUNTER
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   Asthma is under good control last several months, not using any medications given, she will wear mask as kids will wear mask, school classes limited to a ch

## 2021-01-26 ENCOUNTER — TELEPHONE (OUTPATIENT)
Dept: INTERNAL MEDICINE CLINIC | Facility: CLINIC | Age: 51
End: 2021-01-26

## 2021-03-03 RX ORDER — FLUTICASONE PROPIONATE AND SALMETEROL 500; 50 UG/1; UG/1
1 POWDER RESPIRATORY (INHALATION) 2 TIMES DAILY
Qty: 60 EACH | Refills: 0 | Status: SHIPPED | OUTPATIENT
Start: 2021-03-03

## 2021-03-16 NOTE — ED INITIAL ASSESSMENT (HPI)
Dry cough x 2 days. Hx of asthma but has been off of her meds for 2 years. Reports some intermittent shortness of breath and states \"I feel like I'm wheezing. \" denies fever, chills or body aches. independent

## 2021-04-17 ENCOUNTER — APPOINTMENT (OUTPATIENT)
Dept: GENERAL RADIOLOGY | Age: 51
End: 2021-04-17
Attending: PHYSICIAN ASSISTANT
Payer: COMMERCIAL

## 2021-04-17 ENCOUNTER — NURSE TRIAGE (OUTPATIENT)
Dept: INTERNAL MEDICINE CLINIC | Facility: CLINIC | Age: 51
End: 2021-04-17

## 2021-04-17 ENCOUNTER — HOSPITAL ENCOUNTER (OUTPATIENT)
Age: 51
Discharge: HOME | End: 2021-04-17
Attending: PHYSICIAN ASSISTANT
Payer: COMMERCIAL

## 2021-04-17 VITALS
OXYGEN SATURATION: 99 % | HEART RATE: 85 BPM | TEMPERATURE: 97 F | DIASTOLIC BLOOD PRESSURE: 58 MMHG | RESPIRATION RATE: 20 BRPM | SYSTOLIC BLOOD PRESSURE: 106 MMHG

## 2021-04-17 DIAGNOSIS — M54.42 ACUTE LEFT-SIDED LOW BACK PAIN WITH LEFT-SIDED SCIATICA: Primary | ICD-10-CM

## 2021-04-17 PROCEDURE — 99213 OFFICE O/P EST LOW 20 MIN: CPT

## 2021-04-17 PROCEDURE — 72100 X-RAY EXAM L-S SPINE 2/3 VWS: CPT | Performed by: PHYSICIAN ASSISTANT

## 2021-04-17 RX ORDER — CYCLOBENZAPRINE HCL 10 MG
10 TABLET ORAL 3 TIMES DAILY PRN
Qty: 14 TABLET | Refills: 0 | Status: SHIPPED | OUTPATIENT
Start: 2021-04-17 | End: 2021-04-24

## 2021-04-17 RX ORDER — LIDOCAINE 50 MG/G
1 PATCH TOPICAL EVERY 24 HOURS
Qty: 5 PATCH | Refills: 0 | Status: SHIPPED | OUTPATIENT
Start: 2021-04-17 | End: 2021-04-22

## 2021-04-17 RX ORDER — METHYLPREDNISOLONE 4 MG/1
TABLET ORAL
Qty: 1 PACKAGE | Refills: 0 | Status: SHIPPED | OUTPATIENT
Start: 2021-04-17 | End: 2021-05-12 | Stop reason: ALTCHOICE

## 2021-04-17 RX ORDER — TRAMADOL HYDROCHLORIDE 50 MG/1
50 TABLET ORAL EVERY 6 HOURS PRN
Qty: 12 TABLET | Refills: 0 | Status: SHIPPED | OUTPATIENT
Start: 2021-04-17 | End: 2021-05-13

## 2021-04-17 RX ORDER — IBUPROFEN 600 MG/1
TABLET ORAL
Qty: 20 TABLET | Refills: 0 | Status: SHIPPED | OUTPATIENT
Start: 2021-04-17 | End: 2021-05-13

## 2021-04-17 NOTE — TELEPHONE ENCOUNTER
Patient was evaluated at urgent care.    Disposition and Plan   Clinical Impression:  Acute left-sided low back pain with left-sided sciatica  (primary encounter diagnosis)     Disposition:  Discharge     Follow-up:  MD Leora Esposito

## 2021-04-17 NOTE — ED PROVIDER NOTES
Patient Seen in: [facility]    History   Patient presents with:  Back Pain    Stated Complaint: Backache    HPI    Katy Pavon Both is a 54year old female who presents with chief complaint of left low back pain. Onset 2 days ago.   Patient tablet (10 mg total) by mouth nightly. fluticasone-salmeterol (ADVAIR DISKUS) 500-50 MCG/DOSE Inhalation Aerosol Powder, Breath Activated,  Inhale 1 puff into the lungs 2 (two) times daily.    Tacrolimus 0.03 % External Ointment,  Use daily for 7 days and clear.  Neck: The neck is supple. There is no evidence of JVD. No meningeal signs. Chest: There is no tenderness to the chest wall. Respiratory: Respiratory effort was normal.  There is no rales, wheezes, or rhonchi. There is no stridor.   Air entry is to the patient that emergent conditions may arise and to go to the ER for new, worsening or any persistent conditions. I've explained the importance of following up with their doctor as instructed.  The patient verbalized understanding of the discharge inst

## 2021-04-17 NOTE — ED INITIAL ASSESSMENT (HPI)
Low back pain radiating to left leg since Thursday, denies direct trauma or injury, denies bowel or bladder incontinence

## 2021-04-17 NOTE — TELEPHONE ENCOUNTER
Action Requested: Summary for Provider     []  Critical Lab, Recommendations Needed  [] Need Additional Advice  [x]   FYI    []   Need Orders  [] Need Medications Sent to Pharmacy  []  Other     SUMMARY: Patient c/o I think I pulled my back, left lower lynnette

## 2021-04-17 NOTE — TELEPHONE ENCOUNTER
Patient needs to be evaluated first before I can send her for physical therapy, I can see her next week, Endy if needed for the patient

## 2021-04-20 ENCOUNTER — TELEPHONE (OUTPATIENT)
Dept: NEUROLOGY | Facility: CLINIC | Age: 51
End: 2021-04-20

## 2021-04-20 NOTE — TELEPHONE ENCOUNTER
CHARISMATCB     PSR- Please call patient to make an in-office appointment with me within 7 days.  Thanks.

## 2021-05-12 ENCOUNTER — OFFICE VISIT (OUTPATIENT)
Dept: NEUROLOGY | Facility: CLINIC | Age: 51
End: 2021-05-12
Payer: COMMERCIAL

## 2021-05-12 VITALS — WEIGHT: 225 LBS | HEIGHT: 70 IN | BODY MASS INDEX: 32.21 KG/M2

## 2021-05-12 DIAGNOSIS — E66.3 PATIENT OVERWEIGHT: ICD-10-CM

## 2021-05-12 DIAGNOSIS — Z87.39 HISTORY OF LOW BACK PAIN: Primary | ICD-10-CM

## 2021-05-12 PROCEDURE — 99243 OFF/OP CNSLTJ NEW/EST LOW 30: CPT | Performed by: PHYSICAL MEDICINE & REHABILITATION

## 2021-05-12 PROCEDURE — 3008F BODY MASS INDEX DOCD: CPT | Performed by: PHYSICAL MEDICINE & REHABILITATION

## 2021-05-12 NOTE — PROGRESS NOTES
[address]  Progress Note    CHIEF COMPLAINT:  Patient presents with:  Low Back Pain: Patient present with low back aching/burning pain. No known injury. Went to UC 3 weeks ago.  Xray in [facility], Medrol dose [place] No      Sexual activity: Not on file      CURRENT MEDICATIONS:   Current Outpatient Medications   Medication Sig Dispense Refill   • fluticasone-salmeterol (ADVAIR DISKUS) 500-50 MCG/DOSE Inhalation Aerosol Powder, Breath Activated Inhale 1 puff into the l Atraumatic  Extremities: No lower extremity edema bilaterally. Peripheral pulses intact.    Spine:  full and painfree lumbar ROM in all directions  Hips: full and painfree ROM   Neuro:   Cognition: alert & oriented x 3, attentive, able to follow 2 step comm

## 2021-05-13 PROBLEM — E66.3 PATIENT OVERWEIGHT: Status: ACTIVE | Noted: 2021-05-13

## 2021-05-13 PROBLEM — Z87.39 HISTORY OF LOW BACK PAIN: Status: ACTIVE | Noted: 2021-05-13

## 2022-01-10 ENCOUNTER — HOSPITAL ENCOUNTER (OUTPATIENT)
Age: 52
Discharge: HOME | End: 2022-01-10
Payer: COMMERCIAL

## 2022-01-10 ENCOUNTER — APPOINTMENT (OUTPATIENT)
Dept: GENERAL RADIOLOGY | Age: 52
End: 2022-01-10
Attending: NURSE PRACTITIONER
Payer: COMMERCIAL

## 2022-01-10 VITALS
SYSTOLIC BLOOD PRESSURE: 117 MMHG | OXYGEN SATURATION: 98 % | DIASTOLIC BLOOD PRESSURE: 71 MMHG | TEMPERATURE: 98 F | RESPIRATION RATE: 18 BRPM | HEART RATE: 89 BPM

## 2022-01-10 DIAGNOSIS — S93.601A SPRAIN OF RIGHT FOOT, INITIAL ENCOUNTER: Primary | ICD-10-CM

## 2022-01-10 PROCEDURE — 99213 OFFICE O/P EST LOW 20 MIN: CPT

## 2022-01-10 PROCEDURE — 73630 X-RAY EXAM OF FOOT: CPT | Performed by: NURSE PRACTITIONER

## 2022-01-10 NOTE — ED INITIAL ASSESSMENT (HPI)
Pt had a fall on Saturday injuring her R foot. Bruising present to area. 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

## 2022-01-10 NOTE — ED PROVIDER NOTES
Patient presents with:  Leg or Foot Injury      HPI:     Renetta Garcia Both is a 54year old female who presents today with a chief complaint of pain in the ball of the right foot after an injury that occurred couple days ago.   The patient states she fell Concerns:         Service: Not Asked        Blood Transfusions: Not Asked        Caffeine Concern: Yes        Occupational Exposure: Not Asked        Hobby Hazards: Not Asked        Sleep Concern: Not Asked        Stress Concern: Not Asked        W foot. Strong right pedal pulse.        MDM/Assessment/Plan:   Orders for this encounter:    Orders Placed This Encounter      XR FOOT, COMPLETE (MIN 3 VIEWS), RIGHT (CPT=73630)          Order Comments:              foot injury            Order Specific Charlie Blevins

## 2022-01-12 LAB — SARS-COV-2 RNA RESP QL NAA+PROBE: NOT DETECTED

## 2022-04-12 ENCOUNTER — NURSE TRIAGE (OUTPATIENT)
Dept: INTERNAL MEDICINE CLINIC | Facility: CLINIC | Age: 52
End: 2022-04-12

## 2022-05-03 ENCOUNTER — OFFICE VISIT (OUTPATIENT)
Dept: INTERNAL MEDICINE CLINIC | Facility: CLINIC | Age: 52
End: 2022-05-03
Payer: COMMERCIAL

## 2022-05-03 VITALS
DIASTOLIC BLOOD PRESSURE: 72 MMHG | SYSTOLIC BLOOD PRESSURE: 112 MMHG | BODY MASS INDEX: 32.21 KG/M2 | WEIGHT: 225 LBS | HEART RATE: 80 BPM | HEIGHT: 70 IN

## 2022-05-03 DIAGNOSIS — N92.6 IRREGULAR MENSTRUATION: Primary | ICD-10-CM

## 2022-05-03 PROCEDURE — 3078F DIAST BP <80 MM HG: CPT | Performed by: INTERNAL MEDICINE

## 2022-05-03 PROCEDURE — 99213 OFFICE O/P EST LOW 20 MIN: CPT | Performed by: INTERNAL MEDICINE

## 2022-05-03 PROCEDURE — 3074F SYST BP LT 130 MM HG: CPT | Performed by: INTERNAL MEDICINE

## 2022-05-03 PROCEDURE — 3008F BODY MASS INDEX DOCD: CPT | Performed by: INTERNAL MEDICINE

## 2022-06-23 ENCOUNTER — OFFICE VISIT (OUTPATIENT)
Dept: OBGYN CLINIC | Facility: CLINIC | Age: 52
End: 2022-06-23
Payer: COMMERCIAL

## 2022-06-23 VITALS
WEIGHT: 242.81 LBS | SYSTOLIC BLOOD PRESSURE: 114 MMHG | BODY MASS INDEX: 35 KG/M2 | HEART RATE: 70 BPM | DIASTOLIC BLOOD PRESSURE: 76 MMHG

## 2022-06-23 DIAGNOSIS — Z01.419 ENCOUNTER FOR GYNECOLOGICAL EXAMINATION: Primary | ICD-10-CM

## 2022-06-23 PROCEDURE — 99386 PREV VISIT NEW AGE 40-64: CPT | Performed by: OBSTETRICS & GYNECOLOGY

## 2022-06-23 PROCEDURE — 3074F SYST BP LT 130 MM HG: CPT | Performed by: OBSTETRICS & GYNECOLOGY

## 2022-06-23 PROCEDURE — 3078F DIAST BP <80 MM HG: CPT | Performed by: OBSTETRICS & GYNECOLOGY

## 2022-06-24 LAB — HPV I/H RISK 1 DNA SPEC QL NAA+PROBE: NEGATIVE

## 2022-07-23 ENCOUNTER — HOSPITAL ENCOUNTER (OUTPATIENT)
Dept: MAMMOGRAPHY | Age: 52
Discharge: HOME | End: 2022-07-23
Attending: INTERNAL MEDICINE
Payer: COMMERCIAL

## 2022-07-23 DIAGNOSIS — N92.6 IRREGULAR MENSTRUATION: ICD-10-CM

## 2022-07-23 PROCEDURE — 77067 SCR MAMMO BI INCL CAD: CPT | Performed by: INTERNAL MEDICINE

## 2022-07-23 PROCEDURE — 77063 BREAST TOMOSYNTHESIS BI: CPT | Performed by: INTERNAL MEDICINE

## 2022-12-30 ENCOUNTER — OFFICE VISIT (OUTPATIENT)
Facility: CLINIC | Age: 52
End: 2022-12-30
Payer: COMMERCIAL

## 2022-12-30 ENCOUNTER — TELEPHONE (OUTPATIENT)
Facility: CLINIC | Age: 52
End: 2022-12-30

## 2022-12-30 VITALS
HEIGHT: 70 IN | BODY MASS INDEX: 33.67 KG/M2 | HEART RATE: 72 BPM | WEIGHT: 235.19 LBS | DIASTOLIC BLOOD PRESSURE: 72 MMHG | SYSTOLIC BLOOD PRESSURE: 115 MMHG

## 2022-12-30 DIAGNOSIS — Z12.11 SCREEN FOR COLON CANCER: Primary | ICD-10-CM

## 2022-12-30 NOTE — TELEPHONE ENCOUNTER
Scheduled for:  Colonoscopy [address]486  Provider Name:  Dr Martha Chen  Date:  2/10/2023  Location:  [facility]  Sedation:  MAC  Time:  0945 (pt is aware that [facility] will call the day before to confirm arrival time)    Prep:  Single dose  Meds/Allergies Reconciled?:  Physician reviewed  Diagnosis with codes:  CCS Z12.11  Was patient informed to call insurance with codes (Y/N):  Y     Referral sent?:  NA  [facility] or [address] [place] [address] notified?:  I sent an electronic request to Endo Scheduling and received a confirmation today. Medication Orders:  Pt is aware to NOT take iron pills, herbal meds and diet supplements for 7 days before exam. Also to NOT take any form of alcohol, recreational drugs and any forms of ED meds 24 hours before exam.     Misc Orders:       Further instructions given by staff:  I discussed the prep intructions with the patient in office which SHE verbally understood. Copy of instructions was handed to patient as well. Patient was also advised he will receive a call from PAT nurse 72-24 hours prior procedure to schedule Covid test done.

## 2022-12-30 NOTE — PATIENT INSTRUCTIONS
1. Schedule colonoscopy with MAC [Diagnosis: screening]    2.  bowel prep from pharmacy (single dose Golytely)  -Buy over the counter dulcolax laxative, and take one tablet daily for 3 days prior to drinking the bowel prep. 3. Continue all medications for procedure    4. Read all bowel prep instructions carefully    5. AVOID seeds, nuts, popcorn, raw fruits and vegetables (cooked is okay) for 2-3 days before procedure    6. You MAY need to go for COVID testing 72 hours before procedure. The testing team will call you a few days before your procedure to discuss with you if testing is required. If you are asked to go for COVID testing and do not completed the test, the procedure cannot be performed. 7. If you start any NEW medication after your visit today, please notify us. Certain medications will need to be held before the procedure, or the procedure cannot be performed.

## 2023-01-19 ENCOUNTER — TELEPHONE (OUTPATIENT)
Facility: CLINIC | Age: 53
End: 2023-01-19

## 2023-01-19 NOTE — TELEPHONE ENCOUNTER
Bowel prep sent.  THx
Dr. Jordan Hoff    Patient is requesting prep. I pended the order. Please sign if approved. Thank you.
Pts  states prep RX was not received at pharmacy. Please call.
No

## 2023-02-09 ENCOUNTER — TELEPHONE (OUTPATIENT)
Facility: CLINIC | Age: 53
End: 2023-02-09

## 2023-02-09 NOTE — TELEPHONE ENCOUNTER
Pts  called with questions about procedure codes for pts colonoscopy scheduled for 2/10/23. Please call.

## 2023-02-09 NOTE — TELEPHONE ENCOUNTER
Contacted patient's  who is on [employer] and verified patient's . Provided diagnosis and procedure codes and clarified that this is for a screening so would be preventative. He will call the insurance to clarify again. Aware procedure is approved/does not require auth.

## 2023-02-09 NOTE — TELEPHONE ENCOUNTER
Spoke to LÄNGHEM and informed him that our office has the procedure code as 53479 and unsure what modifier he is referring to. I let him know this may be what the billing dept will send to insurance after procedure. I also provided him the number to [employer] to see if they can help answer any questions.

## 2023-02-09 NOTE — TELEPHONE ENCOUNTER
Patient's   is calling because he spoke with insurance and was told that code that was given is not correct. Code has to be for a routine colonoscopy with modifier 33.  They are requesting a call back from nurse

## 2023-02-10 ENCOUNTER — SURGERY CENTER DOCUMENTATION (OUTPATIENT)
Dept: SURGERY | Age: 53
End: 2023-02-10

## 2023-02-10 DIAGNOSIS — K64.8 INTERNAL HEMORRHOIDS: ICD-10-CM

## 2023-02-21 ENCOUNTER — TELEPHONE (OUTPATIENT)
Dept: GASTROENTEROLOGY | Facility: CLINIC | Age: 53
End: 2023-02-21

## 2023-02-21 NOTE — TELEPHONE ENCOUNTER
Health maintenance updated. Last colonoscopy done 2/10/23. 10 year recall placed into Pt Outreach, next due on 2/10/33 per Dr. Mitchell Puri.

## 2023-03-04 ENCOUNTER — HOSPITAL ENCOUNTER (OUTPATIENT)
Age: 53
Discharge: HOME | End: 2023-03-04
Payer: COMMERCIAL

## 2023-03-04 VITALS
DIASTOLIC BLOOD PRESSURE: 59 MMHG | RESPIRATION RATE: 18 BRPM | HEART RATE: 88 BPM | OXYGEN SATURATION: 97 % | TEMPERATURE: 98 F | SYSTOLIC BLOOD PRESSURE: 130 MMHG

## 2023-03-04 DIAGNOSIS — J04.0 ACUTE LARYNGITIS: ICD-10-CM

## 2023-03-04 DIAGNOSIS — J45.909 ACUTE ASTHMA: ICD-10-CM

## 2023-03-04 DIAGNOSIS — R05.1 ACUTE COUGH: Primary | ICD-10-CM

## 2023-03-04 PROCEDURE — 99213 OFFICE O/P EST LOW 20 MIN: CPT

## 2023-03-04 PROCEDURE — 99214 OFFICE O/P EST MOD 30 MIN: CPT

## 2023-03-04 RX ORDER — ALBUTEROL SULFATE 90 UG/1
2 AEROSOL, METERED RESPIRATORY (INHALATION) EVERY 4 HOURS PRN
Qty: 1 EACH | Refills: 0 | Status: SHIPPED | OUTPATIENT
Start: 2023-03-04 | End: 2023-04-03

## 2023-03-04 RX ORDER — BENZONATATE 100 MG/1
100 CAPSULE ORAL 3 TIMES DAILY PRN
Qty: 30 CAPSULE | Refills: 0 | Status: SHIPPED | OUTPATIENT
Start: 2023-03-04 | End: 2023-04-03

## 2023-03-04 RX ORDER — FLUTICASONE PROPIONATE AND SALMETEROL 500; 50 UG/1; UG/1
1 POWDER RESPIRATORY (INHALATION) 2 TIMES DAILY
Qty: 60 EACH | Refills: 0 | Status: SHIPPED | OUTPATIENT
Start: 2023-03-04 | End: 2023-04-03

## 2023-03-04 RX ORDER — PREDNISONE 20 MG/1
40 TABLET ORAL DAILY
Qty: 10 TABLET | Refills: 0 | Status: SHIPPED | OUTPATIENT
Start: 2023-03-04 | End: 2023-03-09

## 2023-03-04 RX ORDER — CODEINE PHOSPHATE AND GUAIFENESIN 10; 100 MG/5ML; MG/5ML
5 SOLUTION ORAL EVERY 6 HOURS PRN
Qty: 50 ML | Refills: 0 | Status: SHIPPED | OUTPATIENT
Start: 2023-03-04

## 2023-04-09 ENCOUNTER — HOSPITAL ENCOUNTER (OUTPATIENT)
Age: 53
Discharge: HOME | End: 2023-04-09
Attending: EMERGENCY MEDICINE
Payer: COMMERCIAL

## 2023-04-09 VITALS
DIASTOLIC BLOOD PRESSURE: 80 MMHG | SYSTOLIC BLOOD PRESSURE: 145 MMHG | RESPIRATION RATE: 17 BRPM | TEMPERATURE: 98 F | HEART RATE: 98 BPM | OXYGEN SATURATION: 100 %

## 2023-04-09 DIAGNOSIS — N93.9 EPISODE OF HEAVY VAGINAL BLEEDING: Primary | ICD-10-CM

## 2023-04-09 LAB
#MXD IC: 0.4 X10ˆ3/UL (ref 0.1–1)
B-HCG UR QL: NEGATIVE
HCT VFR BLD AUTO: 37.4 %
HGB BLD-MCNC: 12.3 G/DL
LYMPHOCYTES # BLD AUTO: 1.8 X10ˆ3/UL (ref 1–4)
LYMPHOCYTES NFR BLD AUTO: 34.4 %
MCH RBC QN AUTO: 29.1 PG (ref 26–34)
MCHC RBC AUTO-ENTMCNC: 32.9 G/DL (ref 31–37)
MCV RBC AUTO: 88.6 FL (ref 80–100)
MIXED CELL %: 6.9 %
NEUTROPHILS # BLD AUTO: 2.9 X10ˆ3/UL (ref 1.5–7.7)
NEUTROPHILS NFR BLD AUTO: 58.7 %
PLATELET # BLD AUTO: 303 X10ˆ3/UL (ref 150–450)
RBC # BLD AUTO: 4.22 X10ˆ6/UL
WBC # BLD AUTO: 5.1 X10ˆ3/UL (ref 4–11)

## 2023-04-09 PROCEDURE — 85025 COMPLETE CBC W/AUTO DIFF WBC: CPT | Performed by: EMERGENCY MEDICINE

## 2023-04-09 PROCEDURE — 99213 OFFICE O/P EST LOW 20 MIN: CPT

## 2023-04-09 PROCEDURE — 81025 URINE PREGNANCY TEST: CPT

## 2023-04-09 PROCEDURE — 36415 COLL VENOUS BLD VENIPUNCTURE: CPT

## 2023-04-09 RX ORDER — MEDROXYPROGESTERONE ACETATE 10 MG/1
20 TABLET ORAL 3 TIMES DAILY
Qty: 42 TABLET | Refills: 0 | Status: SHIPPED | OUTPATIENT
Start: 2023-04-09 | End: 2023-04-16

## 2023-04-09 NOTE — ED INITIAL ASSESSMENT (HPI)
Pt c/o ongoing heavy menstruation bleeding that has been ongoing for 10 days. Denies abdominal pain or urinary sx. States same thing happened last year when pt was in [facility] , was seen in Er there negative US , was told possibly going into menopause. States in the last 10 days has been saturating tampon every 2-3 hrs + clots.

## 2023-07-12 ENCOUNTER — ORDER TRANSCRIPTION (OUTPATIENT)
Dept: ADMINISTRATIVE | Facility: HOSPITAL | Age: 53
End: 2023-07-12

## 2023-07-12 DIAGNOSIS — Z12.31 ENCOUNTER FOR SCREENING MAMMOGRAM FOR MALIGNANT NEOPLASM OF BREAST: Primary | ICD-10-CM

## 2023-07-30 ENCOUNTER — HOSPITAL ENCOUNTER (OUTPATIENT)
Age: 53
Discharge: HOME | End: 2023-07-30
Payer: COMMERCIAL

## 2023-07-30 VITALS
TEMPERATURE: 97 F | DIASTOLIC BLOOD PRESSURE: 75 MMHG | RESPIRATION RATE: 12 BRPM | SYSTOLIC BLOOD PRESSURE: 108 MMHG | BODY MASS INDEX: 32.93 KG/M2 | OXYGEN SATURATION: 97 % | HEART RATE: 76 BPM | HEIGHT: 70 IN | WEIGHT: 230 LBS

## 2023-07-30 DIAGNOSIS — G43.909 MIGRAINE WITHOUT STATUS MIGRAINOSUS, NOT INTRACTABLE, UNSPECIFIED MIGRAINE TYPE: Primary | ICD-10-CM

## 2023-07-30 PROCEDURE — 99214 OFFICE O/P EST MOD 30 MIN: CPT

## 2023-07-30 PROCEDURE — 96375 TX/PRO/DX INJ NEW DRUG ADDON: CPT

## 2023-07-30 PROCEDURE — 96374 THER/PROPH/DIAG INJ IV PUSH: CPT

## 2023-07-30 RX ORDER — KETOROLAC TROMETHAMINE 30 MG/ML
15 INJECTION, SOLUTION INTRAMUSCULAR; INTRAVENOUS ONCE
Status: COMPLETED | OUTPATIENT
Start: 2023-07-30 | End: 2023-07-30

## 2023-07-30 RX ORDER — METOCLOPRAMIDE HYDROCHLORIDE 5 MG/ML
10 INJECTION INTRAMUSCULAR; INTRAVENOUS EVERY 6 HOURS PRN
Status: DISCONTINUED | OUTPATIENT
Start: 2023-07-30 | End: 2023-07-30

## 2023-07-30 NOTE — ED INITIAL ASSESSMENT (HPI)
Patient with 4 days of headache and stiff neck   No head injury/ neck injury  Hx of migraines   + nausea - emesis   - vision changes  Pain is to the front and back of head  Has tried Excedrin and Motrin at home

## 2023-08-26 ENCOUNTER — HOSPITAL ENCOUNTER (OUTPATIENT)
Dept: MAMMOGRAPHY | Facility: HOSPITAL | Age: 53
Discharge: HOME | End: 2023-08-26
Attending: INTERNAL MEDICINE
Payer: COMMERCIAL

## 2023-08-26 DIAGNOSIS — Z12.31 ENCOUNTER FOR SCREENING MAMMOGRAM FOR MALIGNANT NEOPLASM OF BREAST: ICD-10-CM

## 2023-08-26 PROCEDURE — 77067 SCR MAMMO BI INCL CAD: CPT | Performed by: INTERNAL MEDICINE

## 2023-08-26 PROCEDURE — 77063 BREAST TOMOSYNTHESIS BI: CPT | Performed by: INTERNAL MEDICINE

## 2023-10-10 ENCOUNTER — PATIENT MESSAGE (OUTPATIENT)
Dept: INTERNAL MEDICINE CLINIC | Facility: CLINIC | Age: 53
End: 2023-10-10

## 2023-10-11 NOTE — TELEPHONE ENCOUNTER
From: Funmilayo Hawthorne Both  To: Lonnie Calvo  Sent: 10/10/2023 7:37 PM CDT  Subject: please update vaccine record    Hi,  On Monday October 9 I received a flu shot and a covid vaccine at the [facility] in [place] ([address]). Please update my medical records to show that.     Thank you,  Rupa Tavarez Both

## 2023-10-21 ENCOUNTER — APPOINTMENT (OUTPATIENT)
Dept: GENERAL RADIOLOGY | Age: 53
End: 2023-10-21
Attending: EMERGENCY MEDICINE
Payer: COMMERCIAL

## 2023-10-21 ENCOUNTER — HOSPITAL ENCOUNTER (OUTPATIENT)
Age: 53
Discharge: HOME | End: 2023-10-21
Attending: EMERGENCY MEDICINE
Payer: COMMERCIAL

## 2023-10-21 VITALS
HEART RATE: 91 BPM | TEMPERATURE: 99 F | SYSTOLIC BLOOD PRESSURE: 111 MMHG | DIASTOLIC BLOOD PRESSURE: 54 MMHG | RESPIRATION RATE: 16 BRPM | OXYGEN SATURATION: 96 %

## 2023-10-21 DIAGNOSIS — J18.9 PNEUMONIA OF LEFT LOWER LOBE DUE TO INFECTIOUS ORGANISM: Primary | ICD-10-CM

## 2023-10-21 DIAGNOSIS — J98.01 BRONCHOSPASM: ICD-10-CM

## 2023-10-21 PROCEDURE — 71046 X-RAY EXAM CHEST 2 VIEWS: CPT | Performed by: EMERGENCY MEDICINE

## 2023-10-21 PROCEDURE — 99214 OFFICE O/P EST MOD 30 MIN: CPT

## 2023-10-21 PROCEDURE — 99213 OFFICE O/P EST LOW 20 MIN: CPT

## 2023-10-21 RX ORDER — FLUTICASONE PROPIONATE AND SALMETEROL 500; 50 UG/1; UG/1
1 POWDER RESPIRATORY (INHALATION) 2 TIMES DAILY
Qty: 60 EACH | Refills: 0 | Status: SHIPPED | OUTPATIENT
Start: 2023-10-21 | End: 2023-11-20

## 2023-10-21 RX ORDER — PREDNISONE 20 MG/1
40 TABLET ORAL DAILY
Qty: 10 TABLET | Refills: 0 | Status: SHIPPED | OUTPATIENT
Start: 2023-10-21 | End: 2023-10-26

## 2023-10-21 RX ORDER — AMOXICILLIN AND CLAVULANATE POTASSIUM 875; 125 MG/1; MG/1
1 TABLET, FILM COATED ORAL 2 TIMES DAILY
Qty: 20 TABLET | Refills: 0 | Status: SHIPPED | OUTPATIENT
Start: 2023-10-21 | End: 2023-10-31

## 2023-10-21 RX ORDER — AZITHROMYCIN 250 MG/1
TABLET, FILM COATED ORAL
Qty: 6 TABLET | Refills: 0 | Status: SHIPPED | OUTPATIENT
Start: 2023-10-21 | End: 2023-10-26

## 2023-10-21 NOTE — ED INITIAL ASSESSMENT (HPI)
Patient with history of asthma. She states that she has been doing well for several years without her Advair, however, she needed to restart her medication this week when her chest congestion became worse and she developed some dyspnea.  No fevers  Negative home COVID test on Thursday   Some productive cough

## 2024-02-16 ENCOUNTER — OFFICE VISIT (OUTPATIENT)
Dept: INTERNAL MEDICINE CLINIC | Facility: CLINIC | Age: 54
End: 2024-02-16

## 2024-02-16 VITALS
HEART RATE: 81 BPM | SYSTOLIC BLOOD PRESSURE: 123 MMHG | DIASTOLIC BLOOD PRESSURE: 79 MMHG | HEIGHT: 70 IN | BODY MASS INDEX: 34.74 KG/M2 | WEIGHT: 242.63 LBS | RESPIRATION RATE: 18 BRPM

## 2024-02-16 DIAGNOSIS — J45.21 MILD INTERMITTENT ASTHMATIC BRONCHITIS WITH ACUTE EXACERBATION (HCC): Primary | ICD-10-CM

## 2024-02-16 PROCEDURE — 3008F BODY MASS INDEX DOCD: CPT | Performed by: INTERNAL MEDICINE

## 2024-02-16 PROCEDURE — 99214 OFFICE O/P EST MOD 30 MIN: CPT | Performed by: INTERNAL MEDICINE

## 2024-02-16 PROCEDURE — 3078F DIAST BP <80 MM HG: CPT | Performed by: INTERNAL MEDICINE

## 2024-02-16 PROCEDURE — 3074F SYST BP LT 130 MM HG: CPT | Performed by: INTERNAL MEDICINE

## 2024-02-16 RX ORDER — FLUTICASONE PROPIONATE AND SALMETEROL 250; 50 UG/1; UG/1
1 POWDER RESPIRATORY (INHALATION) EVERY 12 HOURS SCHEDULED
Qty: 1 EACH | Refills: 1 | Status: SHIPPED | OUTPATIENT
Start: 2024-02-16

## 2024-02-16 RX ORDER — PREDNISONE 10 MG/1
TABLET ORAL
Qty: 12 TABLET | Refills: 0 | Status: SHIPPED | OUTPATIENT
Start: 2024-02-16

## 2024-02-16 NOTE — PROGRESS NOTES
Subjective:     Patient ID: Selam Kumar Both is a 53 year old female.  Presents for evaluation of a cough    HPI  Patient reports that 3 weeks ago she had cold symptoms, runny nose sore throat that disappeared and cold trigger cough which kept her awake at night last couple nights she is felt better, patient has history of asthma and asthmatic bronchitis and viral illness sometimes can trigger asthmatic condition, in the past she has done well with Advair, did not have to use Advair for at least 3 years since COVID started.  Denies significant shortness of breath, cough is strong to the point that she can lose urine.  Normally she does not have problems with incontinence    Current Outpatient Medications   Medication Sig Dispense Refill    fluticasone-salmeterol (ADVAIR DISKUS) 250-50 MCG/ACT Inhalation Aerosol Powder, Breath Activated Inhale 1 puff into the lungs every 12 (twelve) hours. 1 each 1    predniSONE 10 MG Oral Tab Take 30 mg PO daily x 2 days, Take 20 mg PO daily x 2 days, Take 10 mg PO daily x 2 days 12 tablet 0    Spacer/Aero-Holding Chambers Does not apply Device Use with albuterol inhaler 1 each 0    guaiFENesin-codeine (CHERATUSSIN AC) 100-10 MG/5ML Oral Solution Take 5 mL by mouth every 6 (six) hours as needed for cough. 50 mL 0    Tacrolimus 0.03 % External Ointment Use daily for 7 days and after that as needed to external auditory canal 30 g 0    fluticasone-salmeterol (ADVAIR DISKUS) 500-50 MCG/DOSE Inhalation Aerosol Powder, Breath Activated Inhale 1 puff into the lungs 2 (two) times daily. (Patient not taking: Reported on 5/3/2022) 60 each 0     Allergies:No Known Allergies    Past Medical History:   Diagnosis Date    Asthma     Back problem     Migraines     Pelvic floor dysfunction     dyasisis; physical therapy    Screen for colon cancer 2023    repeat CLN in 10 years    Separation of abdominal muscles     with pregnancy      Past Surgical History:   Procedure Laterality Date     CHOLECYSTECTOMY  2013    cholelithiasis    KNEE ARTHROSCOPY Left 12/21/16    Left knee ACL repair-Dr. Boone      Family History   Problem Relation Age of Onset    Diabetes Mother     Hypertension Mother     Arthritis Mother 72        cervical arthritis    Breast Cancer Mother 70        lumpectomy and radiation    Obesity Father 68    Hypertension Father     Breast Cancer Paternal Grandmother 48    Dementia Paternal Grandfather         Alzheimer's Disease    Skin cancer Paternal Uncle     Prostate Cancer Paternal Uncle         age unknown    Cancer Paternal Uncle         pancreatic      Social History:   Social History     Socioeconomic History    Marital status:    Tobacco Use    Smoking status: Never    Smokeless tobacco: Never   Vaping Use    Vaping Use: Never used   Substance and Sexual Activity    Alcohol use: No    Drug use: No   Other Topics Concern    Caffeine Concern Yes    Exercise No   Social History Narrative    The patient does not use an assistive device..      The patient does live in a home with stairs.        /79 (BP Location: Right arm, Patient Position: Sitting, Cuff Size: large)   Pulse 81   Resp 18   Ht 5' 10\" (1.778 m)   Wt 242 lb 9.6 oz (110 kg)   LMP 05/30/2023 (Approximate)   BMI 34.81 kg/m²    Physical Exam  Constitutional:       Appearance: Normal appearance. She is obese.   HENT:      Head: Normocephalic and atraumatic.   Eyes:      Extraocular Movements: Extraocular movements intact.      Pupils: Pupils are equal, round, and reactive to light.   Cardiovascular:      Rate and Rhythm: Normal rate and regular rhythm.      Heart sounds: No murmur heard.     No gallop.   Pulmonary:      Effort: Pulmonary effort is normal.      Breath sounds: No wheezing or rhonchi.   Musculoskeletal:         General: Normal range of motion.      Cervical back: Normal range of motion and neck supple.      Right lower leg: No edema.      Left lower leg: No edema.   Skin:     General: Skin is  warm.      Coloration: Skin is not jaundiced.   Neurological:      General: No focal deficit present.      Mental Status: She is alert and oriented to person, place, and time. Mental status is at baseline.   Psychiatric:         Mood and Affect: Mood normal.         Behavior: Behavior normal.         Assessment & Plan:   1. Mild intermittent asthmatic bronchitis with acute exacerbation    We will treat with prednisone tapering down dosing for 6 days, starting Advair discus 25o  Admission every 24 hours.  Report in 7 to 10 days if not improved        Meds This Visit:  Requested Prescriptions     Signed Prescriptions Disp Refills    fluticasone-salmeterol (ADVAIR DISKUS) 250-50 MCG/ACT Inhalation Aerosol Powder, Breath Activated 1 each 1     Sig: Inhale 1 puff into the lungs every 12 (twelve) hours.    predniSONE 10 MG Oral Tab 12 tablet 0     Sig: Take 30 mg PO daily x 2 days, Take 20 mg PO daily x 2 days, Take 10 mg PO daily x 2 days       Imaging & Referrals:  None

## 2024-08-08 ENCOUNTER — OFFICE VISIT (OUTPATIENT)
Dept: INTERNAL MEDICINE CLINIC | Facility: CLINIC | Age: 54
End: 2024-08-08

## 2024-08-08 VITALS
DIASTOLIC BLOOD PRESSURE: 68 MMHG | HEIGHT: 70 IN | HEART RATE: 92 BPM | SYSTOLIC BLOOD PRESSURE: 96 MMHG | WEIGHT: 247 LBS | BODY MASS INDEX: 35.36 KG/M2 | RESPIRATION RATE: 18 BRPM

## 2024-08-08 DIAGNOSIS — R82.90 ABNORMAL URINE ODOR: ICD-10-CM

## 2024-08-08 DIAGNOSIS — N30.01 ACUTE CYSTITIS WITH HEMATURIA: Primary | ICD-10-CM

## 2024-08-08 DIAGNOSIS — H60.543 ECZEMA OF BOTH EXTERNAL EARS: ICD-10-CM

## 2024-08-08 LAB
BILIRUBIN: NEGATIVE
GLUCOSE (URINE DIPSTICK): NEGATIVE MG/DL
KETONES (URINE DIPSTICK): NEGATIVE MG/DL
MULTISTIX LOT#: ABNORMAL NUMERIC
NITRITE, URINE: NEGATIVE
PH, URINE: 7 (ref 4.5–8)
PROTEIN (URINE DIPSTICK): 30 MG/DL
SPECIFIC GRAVITY: 1.02 (ref 1–1.03)
URINE-COLOR: YELLOW
UROBILINOGEN,SEMI-QN: 0.2 MG/DL (ref 0–1.9)

## 2024-08-08 PROCEDURE — 81003 URINALYSIS AUTO W/O SCOPE: CPT | Performed by: INTERNAL MEDICINE

## 2024-08-08 PROCEDURE — 3078F DIAST BP <80 MM HG: CPT | Performed by: INTERNAL MEDICINE

## 2024-08-08 PROCEDURE — 3008F BODY MASS INDEX DOCD: CPT | Performed by: INTERNAL MEDICINE

## 2024-08-08 PROCEDURE — 3074F SYST BP LT 130 MM HG: CPT | Performed by: INTERNAL MEDICINE

## 2024-08-08 PROCEDURE — 99213 OFFICE O/P EST LOW 20 MIN: CPT | Performed by: INTERNAL MEDICINE

## 2024-08-08 RX ORDER — TACROLIMUS 0.3 MG/G
OINTMENT TOPICAL
Qty: 30 G | Refills: 1 | Status: SHIPPED | OUTPATIENT
Start: 2024-08-08

## 2024-08-08 RX ORDER — SULFAMETHOXAZOLE AND TRIMETHOPRIM 800; 160 MG/1; MG/1
1 TABLET ORAL 2 TIMES DAILY
Qty: 14 TABLET | Refills: 0 | Status: SHIPPED | OUTPATIENT
Start: 2024-08-08 | End: 2024-08-15

## 2024-08-08 NOTE — PROGRESS NOTES
Selam Low is a 54 year old female.  Chief Complaint   Patient presents with    Urinary     odor     HPI:    Patient presented today for urianry frequency and urgency, change in odor that started two days ago. This morning has pain in bilateral flank areas.   No fever, no visible blood in urine.       Current Outpatient Medications   Medication Sig Dispense Refill    Tacrolimus 0.03 % External Ointment Use daily for 7 days and after that as needed to external auditory canal 30 g 1    sulfamethoxazole-trimethoprim DS (BACTRIM DS) 800-160 MG Oral Tab per tablet Take 1 tablet by mouth 2 (two) times daily for 7 days. 14 tablet 0    fluticasone-salmeterol (ADVAIR DISKUS) 250-50 MCG/ACT Inhalation Aerosol Powder, Breath Activated Inhale 1 puff into the lungs every 12 (twelve) hours. 1 each 1    predniSONE 10 MG Oral Tab Take 30 mg PO daily x 2 days, Take 20 mg PO daily x 2 days, Take 10 mg PO daily x 2 days (Patient not taking: Reported on 8/8/2024) 12 tablet 0    Spacer/Aero-Holding Chambers Does not apply Device Use with albuterol inhaler (Patient not taking: Reported on 8/8/2024) 1 each 0    guaiFENesin-codeine (CHERATUSSIN AC) 100-10 MG/5ML Oral Solution Take 5 mL by mouth every 6 (six) hours as needed for cough. (Patient not taking: Reported on 8/8/2024) 50 mL 0    fluticasone-salmeterol (ADVAIR DISKUS) 500-50 MCG/DOSE Inhalation Aerosol Powder, Breath Activated Inhale 1 puff into the lungs 2 (two) times daily. (Patient not taking: Reported on 5/3/2022) 60 each 0      Past Medical History:    Asthma (HCC)    Back problem    Migraines    Pelvic floor dysfunction    dyasisis; physical therapy    Screen for colon cancer    repeat CLN in 10 years    Separation of abdominal muscles    with pregnancy      Past Surgical History:   Procedure Laterality Date    Cholecystectomy  2013    cholelithiasis    Knee arthroscopy Left 12/21/16    Left knee ACL repair-Dr. Boone      Social History:  Social History      Socioeconomic History    Marital status:    Tobacco Use    Smoking status: Never    Smokeless tobacco: Never   Vaping Use    Vaping status: Never Used   Substance and Sexual Activity    Alcohol use: No    Drug use: No   Other Topics Concern    Caffeine Concern Yes    Exercise No   Social History Narrative    The patient does not use an assistive device..      The patient does live in a home with stairs.      Family History   Problem Relation Age of Onset    Diabetes Mother     Hypertension Mother     Arthritis Mother 72        cervical arthritis    Breast Cancer Mother 70        lumpectomy and radiation    Obesity Father 68    Hypertension Father     Breast Cancer Paternal Grandmother 48    Dementia Paternal Grandfather         Alzheimer's Disease    Skin cancer Paternal Uncle     Prostate Cancer Paternal Uncle         age unknown    Cancer Paternal Uncle         pancreatic      No Known Allergies     REVIEW OF SYSTEMS:   Review of Systems   Review of Systems   Constitutional: Negative for activity change, appetite change and fever.   HENT: Negative for congestion and voice change.    Respiratory: Negative for cough and shortness of breath.    Cardiovascular: Negative for chest pain.   Gastrointestinal: Negative for abdominal distention, abdominal pain and vomiting.   Genitourinary: Negative for hematuria.   Skin: Negative for wound.   Psychiatric/Behavioral: Negative for behavioral problems.   Wt Readings from Last 5 Encounters:   08/08/24 247 lb (112 kg)   02/16/24 242 lb 9.6 oz (110 kg)   07/30/23 230 lb (104.3 kg)   12/30/22 235 lb 3.2 oz (106.7 kg)   06/23/22 242 lb 12.8 oz (110.1 kg)     Body mass index is 35.44 kg/m².      EXAM:   BP 96/68 (BP Location: Left arm, Patient Position: Sitting, Cuff Size: large)   Pulse 92   Resp 18   Ht 5' 10\" (1.778 m)   Wt 247 lb (112 kg)   BMI 35.44 kg/m²   Physical Exam   Constitutional:       Appearance: Normal appearance.   HENT:      Head: Normocephalic.    Eyes:      Conjunctiva/sclera: Conjunctivae normal.   Cardiovascular:      Rate and Rhythm: Normal rate and regular rhythm.      Heart sounds: Normal heart sounds. No murmur heard.  Pulmonary:      Effort: Pulmonary effort is normal.      Breath sounds: Normal breath sounds. No rhonchi or rales.   Abdominal:      General: Bowel sounds are normal.      Palpations: Abdomen is soft.      Tenderness: There is no abdominal tenderness.   Musculoskeletal:      Cervical back: Neck supple.      Right lower leg: No edema.      Left lower leg: No edema.   Skin:     General: Skin is warm and dry.   Neurological:      General: No focal deficit present.      Mental Status: He is alert and oriented to person, place, and time. Mental status is at baseline.   Psychiatric:         Mood and Affect: Mood normal.         Behavior: Behavior normal.       ASSESSMENT AND PLAN:   1. Acute cystitis with hematuria  - increase hydration   - ytlenol as needed  - URINALYSIS, AUTO, W/O SCOPE  - Urine Culture, Routine [E]; Future  - sulfamethoxazole-trimethoprim DS (BACTRIM DS) 800-160 MG Oral Tab per tablet; Take 1 tablet by mouth 2 (two) times daily for 7 days.  Dispense: 14 tablet; Refill: 0  - Urine Culture, Routine [E]    2. Abnormal urine odor  - URINALYSIS, AUTO, W/O SCOPE    3. Eczema of both external ears  - vaseline as needed  - Tacrolimus 0.03 % External Ointment; Use daily for 7 days and after that as needed to external auditory canal  Dispense: 30 g; Refill: 1      The patient indicates understanding of these issues and agrees to the plan.      Linda Gonzales MD

## 2024-10-11 ENCOUNTER — HOSPITAL ENCOUNTER (OUTPATIENT)
Age: 54
Discharge: HOME | End: 2024-10-11
Payer: COMMERCIAL

## 2024-10-11 ENCOUNTER — APPOINTMENT (OUTPATIENT)
Dept: GENERAL RADIOLOGY | Age: 54
End: 2024-10-11
Attending: NURSE PRACTITIONER
Payer: COMMERCIAL

## 2024-10-11 VITALS
OXYGEN SATURATION: 99 % | TEMPERATURE: 98 F | HEART RATE: 77 BPM | DIASTOLIC BLOOD PRESSURE: 57 MMHG | RESPIRATION RATE: 16 BRPM | SYSTOLIC BLOOD PRESSURE: 131 MMHG

## 2024-10-11 DIAGNOSIS — J45.21 MILD INTERMITTENT ASTHMA WITH ACUTE EXACERBATION (HCC): Primary | ICD-10-CM

## 2024-10-11 LAB
#MXD IC: 0.8 X10ˆ3/UL (ref 0.1–1)
BUN BLD-MCNC: 19 MG/DL (ref 7–18)
CHLORIDE BLD-SCNC: 104 MMOL/L (ref 98–112)
CO2 BLD-SCNC: 26 MMOL/L (ref 21–32)
CREAT BLD-MCNC: 1 MG/DL
EGFRCR SERPLBLD CKD-EPI 2021: 68 ML/MIN/1.73M2 (ref 60–?)
GLUCOSE BLD-MCNC: 103 MG/DL (ref 70–99)
HCT VFR BLD AUTO: 41.2 %
HCT VFR BLD CALC: 42 %
HGB BLD-MCNC: 13.4 G/DL
ISTAT IONIZED CALCIUM FOR CHEM 8: 1.11 MMOL/L (ref 1.12–1.32)
LYMPHOCYTES # BLD AUTO: 2.5 X10ˆ3/UL (ref 1–4)
LYMPHOCYTES NFR BLD AUTO: 35.1 %
MCH RBC QN AUTO: 29.8 PG (ref 26–34)
MCHC RBC AUTO-ENTMCNC: 32.5 G/DL (ref 31–37)
MCV RBC AUTO: 91.6 FL (ref 80–100)
MIXED CELL %: 11.9 %
NEUTROPHILS # BLD AUTO: 3.8 X10ˆ3/UL (ref 1.5–7.7)
NEUTROPHILS NFR BLD AUTO: 53 %
PLATELET # BLD AUTO: 309 X10ˆ3/UL (ref 150–450)
POTASSIUM BLD-SCNC: 3.7 MMOL/L (ref 3.6–5.1)
RBC # BLD AUTO: 4.5 X10ˆ6/UL
SODIUM BLD-SCNC: 141 MMOL/L (ref 136–145)
TROPONIN I BLD-MCNC: <0.02 NG/ML
WBC # BLD AUTO: 7.1 X10ˆ3/UL (ref 4–11)

## 2024-10-11 PROCEDURE — 93005 ELECTROCARDIOGRAM TRACING: CPT

## 2024-10-11 PROCEDURE — 93010 ELECTROCARDIOGRAM REPORT: CPT

## 2024-10-11 PROCEDURE — 99215 OFFICE O/P EST HI 40 MIN: CPT

## 2024-10-11 PROCEDURE — 71046 X-RAY EXAM CHEST 2 VIEWS: CPT | Performed by: NURSE PRACTITIONER

## 2024-10-11 PROCEDURE — 80047 BASIC METABLC PNL IONIZED CA: CPT

## 2024-10-11 PROCEDURE — 84484 ASSAY OF TROPONIN QUANT: CPT

## 2024-10-11 PROCEDURE — 85025 COMPLETE CBC W/AUTO DIFF WBC: CPT | Performed by: NURSE PRACTITIONER

## 2024-10-11 PROCEDURE — 36415 COLL VENOUS BLD VENIPUNCTURE: CPT

## 2024-10-11 RX ORDER — PREDNISONE 20 MG/1
40 TABLET ORAL DAILY
Qty: 10 TABLET | Refills: 0 | Status: SHIPPED | OUTPATIENT
Start: 2024-10-11 | End: 2024-10-16

## 2024-10-11 NOTE — ED INITIAL ASSESSMENT (HPI)
Patient arrived ambulatory to room c/o symptoms that started 2 days ago. +non productive cough +left ear pain. No nasal congestion. No fevers. No n/v/d. Patient states she has h/o asthma and feels she may need a course of steroids.

## 2024-10-12 NOTE — ED PROVIDER NOTES
He    Patient Seen in: [facility]      History     Chief Complaint   Patient presents with    Cough     Stated Complaint: sore throat  Subjective:   54-year-old female with a history of asthma presents for a possible asthma exacerbation.  She states her asthma has been bothering her for the past 4 days.  She has had a bronchospastic dry cough.  She states she usually has an asthma attack around this time of year.  She states last year when this occurred she needed a course of prednisone.  She takes Advair at home.  She states that she has not had problems with her asthma since COVID.  No fevers.  No chills.  She is eating and drinking normally.  No difficulty breathing.  She is complaining of some left jaw pain near the TMJ joint.  It is reproducible with movement.  She said at times she has had intermittent chest pain, but assumed this was from the dry cough and her asthma.  No swelling to her lower extremities.  She appears nontoxic.  No personal or family cardiac history.      Objective:   Past Medical History:    Asthma (HCC)    Back problem    Migraines    Pelvic floor dysfunction    dyasisis; physical therapy    Screen for colon cancer    repeat CLN in 10 years    Separation of abdominal muscles    with pregnancy            Past Surgical History:   Procedure Laterality Date    Cholecystectomy  2013    cholelithiasis    Knee arthroscopy Left 12/21/16    Left knee ACL repair-Dr. Boone              Social History     Socioeconomic History    Marital status:    Tobacco Use    Smoking status: Never    Smokeless tobacco: Never   Vaping Use    Vaping status: Never Used   Substance and Sexual Activity    Alcohol use: No    Drug use: No   Other Topics Concern    Caffeine Concern Yes    Exercise No   Social History Narrative    The patient does not use an assistive device..      The patient does live in a home with stairs.            Review of Systems    Positive for stated complaint: Cough      Other systems are as noted in HPI.  Constitutional and vital signs reviewed.      All other systems reviewed and negative except as noted above.    Physical Exam     ED Triage Vitals [10/11/24 1738]   /57   Pulse 77   Resp 16   Temp 97.7 °F (36.5 °C)   Temp src Temporal   SpO2 99 %   O2 Device None (Room air)     Current:/57   Pulse 77   Temp 97.7 °F (36.5 °C) (Temporal)   Resp 16   SpO2 99%     Physical Exam  Vitals and nursing note reviewed.   Constitutional:       General: She is not in acute distress.     Appearance: Normal appearance. She is not toxic-appearing.   HENT:      Head: Normocephalic.      Right Ear: Ear canal normal. A middle ear effusion is present. Tympanic membrane is not erythematous.      Left Ear: Ear canal normal. A middle ear effusion is present. Tympanic membrane is not erythematous.      Nose: Nose normal. No congestion or rhinorrhea.      Right Sinus: No maxillary sinus tenderness or frontal sinus tenderness.      Left Sinus: No maxillary sinus tenderness or frontal sinus tenderness.      Mouth/Throat:      Mouth: Mucous membranes are moist.      Pharynx: No oropharyngeal exudate or posterior oropharyngeal erythema.   Eyes:      Extraocular Movements: Extraocular movements intact.      Conjunctiva/sclera: Conjunctivae normal.      Pupils: Pupils are equal, round, and reactive to light.   Cardiovascular:      Rate and Rhythm: Normal rate and regular rhythm.   Pulmonary:      Effort: Pulmonary effort is normal.      Breath sounds: Normal breath sounds. No wheezing, rhonchi or rales.      Comments: Bronchospastic dry cough during exam.  Musculoskeletal:         General: Normal range of motion.      Cervical back: Normal range of motion and neck supple.   Lymphadenopathy:      Cervical: No cervical adenopathy.   Skin:     General: Skin is warm and dry.      Capillary Refill: Capillary refill takes less than 2 seconds.      Findings: No rash.   Neurological:      General: No  focal deficit present.      Mental Status: She is alert and oriented to person, place, and time.   Psychiatric:         Mood and Affect: Mood normal.         Behavior: Behavior normal.         ED Course   XR CHEST PA + LAT CHEST (CPT=71046)    Result Date: 10/11/2024  CONCLUSION: No acute cardiopulmonary abnormality.   Dictated by (CST): Chidi Hook MD on 10/11/2024 at 6:42 PM     Finalized by (CST): Chidi Hook MD on 10/11/2024 at 6:43 PM         Labs Reviewed   POCT ISTAT CHEM8 CARTRIDGE - Abnormal; Notable for the following components:       Result Value    ISTAT BUN 19 (*)     ISTAT Ionized Calcium 1.11 (*)     ISTAT Glucose 103 (*)     All other components within normal limits   ISTAT TROPONIN - Normal   POCT CBC       MDM     Medical Decision Making  Due to the patient having atraumatic left jaw pain, cardiac workup was done.  Cardiac workup was unremarkable.  The patient is aware.  We discussed that maybe she pulled a muscle or strained her TMJ joint when she was coughing.  She will continue Advair.  I will write her a 5-day course of prednisone.  She will follow-up closely with her primary care doctor.  She is aware for any worsening or concerning symptoms, to go to the nearest emergency department for further evaluation.    Amount and/or Complexity of Data Reviewed  Labs: ordered.     Details: The patient CBC, i-STAT, and troponin are unremarkable.  Radiology: ordered.     Details: I visualized the chest x-ray images which are negative for any pneumonia or other acute process.  ECG/medicine tests: ordered.     Details: Heart rate 71  Normal sinus rhythm  No ST elevation or ischemic changes  No previous to compare to  Discussion of management or test interpretation with external provider(s): I discussed this patient with Dr. Iqbal.  She agrees with plan of care.    Risk  Prescription drug management.  Risk Details: Acute asthma versus MI versus pneumonia        Disposition and Plan     Clinical  Impression:  1. Mild intermittent asthma with acute exacerbation (HCC)         Disposition:  Discharge  10/11/2024  7:05 pm    Follow-up:  Mell Christensen MD  [address] [place] [id number] [phone]    Schedule an appointment as soon as possible for a visit in 3 days            Medications Prescribed:  Discharge Medication List as of 10/11/2024  7:18 PM        START taking these medications    Details   !! predniSONE 20 MG Oral Tab Take 2 tablets (40 mg total) by mouth daily for 5 days., Normal, Disp-10 tablet, R-0       !! - Potential duplicate medications found. Please discuss with provider.

## 2024-10-13 LAB
ATRIAL RATE: 71 BPM
P AXIS: 36 DEGREES
P-R INTERVAL: 136 MS
Q-T INTERVAL: 410 MS
QRS DURATION: 76 MS
QTC CALCULATION (BEZET): 445 MS
R AXIS: 41 DEGREES
T AXIS: 22 DEGREES
VENTRICULAR RATE: 71 BPM

## 2024-10-27 ENCOUNTER — HOSPITAL ENCOUNTER (OUTPATIENT)
Age: 54
Discharge: HOME | End: 2024-10-27
Payer: COMMERCIAL

## 2024-10-27 ENCOUNTER — APPOINTMENT (OUTPATIENT)
Dept: GENERAL RADIOLOGY | Age: 54
End: 2024-10-27
Attending: Physician Assistant
Payer: COMMERCIAL

## 2024-10-27 VITALS
OXYGEN SATURATION: 98 % | SYSTOLIC BLOOD PRESSURE: 128 MMHG | HEART RATE: 72 BPM | RESPIRATION RATE: 18 BRPM | DIASTOLIC BLOOD PRESSURE: 53 MMHG | TEMPERATURE: 99 F

## 2024-10-27 DIAGNOSIS — J40 BRONCHITIS: Primary | ICD-10-CM

## 2024-10-27 LAB
#MXD IC: 0.4 X10ˆ3/UL (ref 0.1–1)
BUN BLD-MCNC: 18 MG/DL (ref 7–18)
CHLORIDE BLD-SCNC: 103 MMOL/L (ref 98–112)
CO2 BLD-SCNC: 27 MMOL/L (ref 21–32)
CREAT BLD-MCNC: 0.8 MG/DL
DDIMER WHOLE BLOOD: <200 NG/ML DDU (ref ?–400)
EGFRCR SERPLBLD CKD-EPI 2021: 89 ML/MIN/1.73M2 (ref 60–?)
GLUCOSE BLD-MCNC: 227 MG/DL (ref 70–99)
HCT VFR BLD AUTO: 42.7 %
HCT VFR BLD CALC: 46 %
HGB BLD-MCNC: 13.8 G/DL
ISTAT IONIZED CALCIUM FOR CHEM 8: 1.28 MMOL/L (ref 1.12–1.32)
LYMPHOCYTES # BLD AUTO: 1.2 X10ˆ3/UL (ref 1–4)
LYMPHOCYTES NFR BLD AUTO: 25.8 %
MCH RBC QN AUTO: 29.8 PG (ref 26–34)
MCHC RBC AUTO-ENTMCNC: 32.3 G/DL (ref 31–37)
MCV RBC AUTO: 92.2 FL (ref 80–100)
MIXED CELL %: 7.3 %
NEUTROPHILS # BLD AUTO: 3.2 X10ˆ3/UL (ref 1.5–7.7)
NEUTROPHILS NFR BLD AUTO: 66.9 %
PLATELET # BLD AUTO: 284 X10ˆ3/UL (ref 150–450)
POTASSIUM BLD-SCNC: 3.9 MMOL/L (ref 3.6–5.1)
RBC # BLD AUTO: 4.63 X10ˆ6/UL
SODIUM BLD-SCNC: 141 MMOL/L (ref 136–145)
TROPONIN I BLD-MCNC: <0.02 NG/ML
WBC # BLD AUTO: 4.8 X10ˆ3/UL (ref 4–11)

## 2024-10-27 PROCEDURE — 99215 OFFICE O/P EST HI 40 MIN: CPT

## 2024-10-27 PROCEDURE — 36415 COLL VENOUS BLD VENIPUNCTURE: CPT

## 2024-10-27 PROCEDURE — 99214 OFFICE O/P EST MOD 30 MIN: CPT

## 2024-10-27 PROCEDURE — 85025 COMPLETE CBC W/AUTO DIFF WBC: CPT | Performed by: PHYSICIAN ASSISTANT

## 2024-10-27 PROCEDURE — 93005 ELECTROCARDIOGRAM TRACING: CPT

## 2024-10-27 PROCEDURE — 93010 ELECTROCARDIOGRAM REPORT: CPT

## 2024-10-27 PROCEDURE — 80047 BASIC METABLC PNL IONIZED CA: CPT

## 2024-10-27 PROCEDURE — 84484 ASSAY OF TROPONIN QUANT: CPT

## 2024-10-27 PROCEDURE — 71046 X-RAY EXAM CHEST 2 VIEWS: CPT | Performed by: PHYSICIAN ASSISTANT

## 2024-10-27 PROCEDURE — 85378 FIBRIN DEGRADE SEMIQUANT: CPT | Performed by: PHYSICIAN ASSISTANT

## 2024-10-27 RX ORDER — ALBUTEROL SULFATE 90 UG/1
2 INHALANT RESPIRATORY (INHALATION) EVERY 4 HOURS PRN
Qty: 1 EACH | Refills: 0 | Status: SHIPPED | OUTPATIENT
Start: 2024-10-27 | End: 2024-11-26

## 2024-10-27 RX ORDER — BENZONATATE 200 MG/1
200 CAPSULE ORAL 3 TIMES DAILY PRN
Qty: 30 CAPSULE | Refills: 0 | Status: SHIPPED | OUTPATIENT
Start: 2024-10-27

## 2024-10-27 NOTE — ED INITIAL ASSESSMENT (HPI)
Patient arrives ambulatory with c/o cough x 1 month and unable to get her asthma under control. Reports being here for same issue earlier this month.

## 2024-10-27 NOTE — DISCHARGE INSTRUCTIONS
Use inhaler as needed for shortness of breath and/or wheezing  Benzonatate for cough as needed up to 3 times daily     Alternate Tylenol and Motrin every 3 hours for pain or fever > 100.4 degrees  Drink plenty of fluids   Get plenty of rest     You may benefit from taking a decongestant (e.g. Sudafed)  You may benefit from taking a daily allergy medication (e.g. Zyrtec)  You may benefit from using a humidifier    Sleep with head elevated and avoid laying flat  Avoid having air blow on your face    Wash hands often  Disinfect your environment  Do not share utensils or drinks    Symptoms may take a few weeks to resolve  Follow up with your primary care provider

## 2024-10-27 NOTE — ED PROVIDER NOTES
Chief Complaint   Patient presents with    Cough/URI    Asthma    Difficulty Breathing       History obtained from: patient   services not used     HPI:     Selam Kumar Both is a 54 year old female with history of asthma who presents with persistent cough and shortness of breath x 3 weeks.  Patient was seen in  for this complaint on 10/11.  Patient was diagnosed with mild intermittent asthma with acute exacerbation and prescribed 5-day course of prednisone which she completed as directed without relief.  Patient continues to have dry cough, shortness of breath, and chest tightness.  Patient using her Advair inhaler daily as directed. Denies fevers, chills, palpitations, dizziness, lightheadedness, syncope, sinus pain or pressure, headaches, neck stiffness, abdominal pain, vomiting, rash.    PMH  Past Medical History:    Asthma (HCC)    Back problem    Migraines    Pelvic floor dysfunction    dyasisis; physical therapy    Screen for colon cancer    repeat CLN in 10 years    Separation of abdominal muscles    with pregnancy       PFSH    PFSH asessment screens reviewed and agree.  Nurses notes reviewed I agree with documentation.    Family History   Problem Relation Age of Onset    Diabetes Mother     Hypertension Mother     Arthritis Mother 72        cervical arthritis    Breast Cancer Mother 70        lumpectomy and radiation    Obesity Father 68    Hypertension Father     Breast Cancer Paternal Grandmother 48    Dementia Paternal Grandfather         Alzheimer's Disease    Skin cancer Paternal Uncle     Prostate Cancer Paternal Uncle         age unknown    Cancer Paternal Uncle         pancreatic     Family history reviewed with patient/caregiver and is not pertinent to presenting problem.  Social History     Socioeconomic History    Marital status:      Spouse name: Not on file    Number of children: Not on file    Years of education: Not on file    Highest education level: Not on file    Occupational History    Not on file   Tobacco Use    Smoking status: Never    Smokeless tobacco: Never   Vaping Use    Vaping status: Never Used   Substance and Sexual Activity    Alcohol use: No    Drug use: No    Sexual activity: Not on file   Other Topics Concern     Service Not Asked    Blood Transfusions Not Asked    Caffeine Concern Yes    Occupational Exposure Not Asked    Hobby Hazards Not Asked    Sleep Concern Not Asked    Stress Concern Not Asked    Weight Concern Not Asked    Special Diet Not Asked    Back Care Not Asked    Exercise No    Bike Helmet Not Asked    Seat Belt Not Asked    Self-Exams Not Asked   Social History Narrative    The patient does not use an assistive device..      The patient does live in a home with stairs.     Social Drivers of Health     Financial Resource Strain: Not on file   Food Insecurity: Not on file   Transportation Needs: Not on file   Physical Activity: Not on file   Stress: Not on file   Social Connections: Not on file   Housing Stability: Not on file         ROS:   Positive for stated complaint: cough, shortness of breath, chest tightness   All other systems reviewed and negative except as noted above.  Constitutional and Vital Signs Reviewed.    Physical Exam:     Findings:    /53   Pulse 72   Temp 98.5 °F (36.9 °C) (Temporal)   Resp 18   SpO2 98%   GENERAL: well developed, no acute distress, non-toxic appearing   SKIN: good skin turgor, no obvious rashes  HEAD: normocephalic, atraumatic  EYES: sclera non-icteric bilaterally, conjunctiva clear bilaterally  EARS: canals clear bilaterally, TMs clear bilaterally  NOSE: nasal turbinates pink, normal mucosa  OROPHARYNX: MMM, pharynx clear, maintaining airway and secretions  NECK: supple, no lymphadenopathy, no nuchal rigidity, no trismus, no edema, phonation normal    CARDIO: RRR, normal heart sounds   LUNGS: clear to auscultation bilaterally, no increased WOB, no rales, rhonchi, or wheezes, occasional  coughing   EXTREMITIES: no cyanosis or edema, GARCIA without difficulty  NEURO: no focal deficits  PSYCH: alert and oriented x3, answering questions appropriately, mood appropriate    MDM/Assessment/Plan:   Orders for this encounter:    Orders Placed This Encounter    XR CHEST PA + LAT CHEST (CPT=71046)     congestion,shortness of breath Patient arrives ambulatory with c/o cough x 1 month and unable to get her   asthma under control. Reports being here for same issue earlier this   month.        Order Specific Question:   What is the Relevant Clinical Indication / Reason for Exam?     Answer:   cough, shortness of breath     Order Specific Question:   Release to patient     Answer:   Immediate    D-Dimer,Triage     Order Specific Question:   Release to patient     Answer:   Immediate    POCT CBC     Order Specific Question:   Release to patient     Answer:   Immediate    EKG 12 Lead     Order Specific Question:   Release to patient     Answer:   Immediate    POCT ISTAT chem8 cartridge    ISTAT Troponin    iStat (Chem 8)    iStat (Troponin)    benzonatate 200 MG Oral Cap     Sig: Take 1 capsule (200 mg total) by mouth 3 (three) times daily as needed for cough.     Dispense:  30 capsule     Refill:  0    albuterol 108 (90 Base) MCG/ACT Inhalation Aero Soln     Sig: Inhale 2 puffs into the lungs every 4 (four) hours as needed for Wheezing.     Dispense:  1 each     Refill:  0       Labs performed this visit:  Recent Results (from the past 10 hours)   D-Dimer,Triage    Collection Time: 10/27/24  8:56 AM   Result Value Ref Range    D-Dimer DDU <200 <400 ng/mL DDU   POCT CBC    Collection Time: 10/27/24  8:56 AM   Result Value Ref Range    WBC IC 4.8 4.0 - 11.0 x10ˆ3/uL    RBC IC 4.63 3.80 - 5.30 X10ˆ6/uL    HGB IC 13.8 12.0 - 16.0 g/dL    HCT IC 42.7 35.0 - 48.0 %    MCV IC 92.2 80.0 - 100.0 fL    MCH IC 29.8 26.0 - 34.0 pg    MCHC IC 32.3 31.0 - 37.0 g/dL    PLT .0 150.0 - 450.0 X10ˆ3/uL    # Neutrophil 3.2 1.5 - 7.7  X10ˆ3/uL    # Lymphocyte 1.2 1.0 - 4.0 X10ˆ3/uL    # Mixed Cells 0.4 0.1 - 1.0 X10ˆ3/uL    Neutrophil % 66.9 %    Lymphocyte % 25.8 %    Mixed Cell % 7.3 %   POCT ISTAT chem8 cartridge    Collection Time: 10/27/24  9:18 AM   Result Value Ref Range    ISTAT Sodium 141 136 - 145 mmol/L    ISTAT BUN 18 7 - 18 mg/dL    ISTAT Potassium 3.9 3.6 - 5.1 mmol/L    ISTAT Chloride 103 98 - 112 mmol/L    ISTAT Ionized Calcium 1.28 1.12 - 1.32 mmol/L    ISTAT Hematocrit 46 34 - 50 %    ISTAT Glucose 227 (H) 70 - 99 mg/dL    ISTAT TCO2 27 21 - 32 mmol/L    ISTAT Creatinine 0.80 0.55 - 1.02 mg/dL    eGFR-Cr 89 >=60 mL/min/1.73m2   ISTAT Troponin    Collection Time: 10/27/24  9:20 AM   Result Value Ref Range    ISTAT Troponin <0.02 <0.045 ng/mL ng/mL   EKG 12 Lead    Collection Time: 10/27/24  9:29 AM   Result Value Ref Range    Ventricular rate 73 BPM    Atrial rate 73 BPM    P-R Interval 134 ms    QRS Duration 76 ms    Q-T Interval 384 ms    QTC Calculation (Bezet) 423 ms    P Axis 30 degrees    R Axis 33 degrees    T Axis 24 degrees       Imaging performed this visit:  XR CHEST PA + LAT CHEST (CPT=71046)   Final Result   PROCEDURE: XR CHEST PA + LAT CHEST (CPT=71046)       COMPARISON: [facility] [place] [facility], XR CHEST PA + LAT    CHEST (CPT=71046), 10/11/2024, 6:20 PM.  [facility] [place] [facility], XR CHEST PA + LAT CHEST (EYR=95636), 10/21/2023, 12:11 PM.     [facility] [place] [facility], XR CHEST PA + LAT CHEST (CPT=71046), 12/27/2019, 3:28 PM.     [facility] [place] [facility], X CHEST PA LAT ROUTINE,    2/16/2007, 6:32 PM.       INDICATIONS: Chest tightness, nonproductive cough, and shortness of breath    over the preceding 1 month. History of asthma.       TECHNIQUE:   Two views.         FINDINGS:    CARDIAC/VASC: The cardiomediastinal silhouette is not enlarged. There is    mild tortuosity of the thoracic aorta with peripheral atherosclerotic    vascular  calcification. The pulmonary vascularity is within normal limits.       MEDIAST/BETO: No visible mass or adenopathy.    LUNGS/PLEURA: No airspace consolidation, pleural effusion, or pneumothorax    is evident.     BONES: Mild multilevel degenerative changes of the thoracic spine are    apparent. There is no fracture or visible bony lesion.                        =====   CONCLUSION:    Stable chest without radiographically evident acute intrathoracic process.               Dictated by (CST): Wagner Solis MD on 10/27/2024 at 9:15 AM        Finalized by (CST): Wagner Solis MD on 10/27/2024 at 9:16 AM                   Medical Decision Making  DDx includes viral URI versus bronchitis versus pneumonia versus asthma exacerbation versus ACS versus PE versus other.  Patient is overall well-appearing with stable vitals and tolerating oral intake.  No hypoxia or signs of respiratory distress.  Given persistent symptoms including dry cough, shortness of breath, and chest tightness, recommend evaluation including repeat chest x-ray and labs today.  Patient agreeable to this plan.    EKG reviewed, normal sinus rhythm, rate 73, no ST elevation. CBC reviewed, grossly unremarkable without evidence of infection or anemia.  BMP reviewed, notable for hyperglycemia of 227, otherwise grossly unremarkable without evidence of further electrolyte derangements or kidney dysfunction.  D-dimer within normal limits.  Troponin within normal limits.  Chest x-ray reviewed, stable chest without evidence of acute cardiopulmonary process.     On reassessment, patient is resting comfortably and remains well-appearing.  Discussed results with patient. Discussed possible etiologies of persistent symptoms with patient and overall reassuring work up today. Discussed supportive care for suspected bronchitis including rest, increased fluid intake, using a humidifier. Rx albuterol inhaler, tessalon for cough as needed. Instructed patient to go  directly to nearest ER with any worsening or concerning symptoms. Follow up with PCP.     Discussed case with supervising attending Dr. Cronin who is in agreement with assessment and plan.       Amount and/or Complexity of Data Reviewed  Labs: ordered.  Radiology: ordered and independent interpretation performed.  ECG/medicine tests: ordered and independent interpretation performed.    Risk  OTC drugs.  Prescription drug management.          Diagnosis:    ICD-10-CM    1. Bronchitis  J40           All results reviewed and discussed with patient/patient's family. Patient/patient's family verbalize excellent understanding of instructions and feels comfortable with plan. All of patient's/patient's family's questions were addressed.   See AVS for detailed discharge instructions for your condition today.    Follow Up with:  Mell Christensen MD  [address] [place] [address] [phone]          Note: This document was dictated using Dragon medical dictation software.  Proofreading was performed to the best of my ability, but errors may be present.    Yesenia Alarcon PA-C

## 2024-10-28 LAB
ATRIAL RATE: 73 BPM
P AXIS: 30 DEGREES
P-R INTERVAL: 134 MS
Q-T INTERVAL: 384 MS
QRS DURATION: 76 MS
QTC CALCULATION (BEZET): 423 MS
R AXIS: 33 DEGREES
T AXIS: 24 DEGREES
VENTRICULAR RATE: 73 BPM

## 2025-01-15 ENCOUNTER — TELEMEDICINE (OUTPATIENT)
Dept: INTERNAL MEDICINE CLINIC | Facility: CLINIC | Age: 55
End: 2025-01-15
Payer: COMMERCIAL

## 2025-01-15 DIAGNOSIS — J45.20 MILD INTERMITTENT ASTHMA WITHOUT COMPLICATION (HCC): ICD-10-CM

## 2025-01-15 DIAGNOSIS — S03.40XA SPRAIN OF TEMPOROMANDIBULAR JOINT, INITIAL ENCOUNTER: Primary | ICD-10-CM

## 2025-01-15 PROCEDURE — 98004 SYNCH AUDIO-VIDEO EST SF 10: CPT | Performed by: INTERNAL MEDICINE

## 2025-01-15 RX ORDER — TIOTROPIUM BROMIDE 18 UG/1
18 CAPSULE ORAL; RESPIRATORY (INHALATION) DAILY
Qty: 90 CAPSULE | Refills: 3 | Status: SHIPPED | OUTPATIENT
Start: 2025-01-15 | End: 2026-01-10

## 2025-01-15 NOTE — PROGRESS NOTES
Selam Kumar Both is a 54 year old female.No chief complaint on file.    HPI:   The visit was conducted with the use of interactive audio and video telecommunication system that permits real time communication between the patient and provider. Patient consent for virtual visit obtained, The patient was made aware of the limitations of the telehealth visit, including treatment limitations as no physical exam could be performed.  The patient was advised to call 911 or to go to the ER in case there was an emergency.patient understands and wishes to proceed     Patient presented today for follow-up.  She states that 2 days ago she was in [place] when all of a sudden she felt jaw pain to her left jaw pain was severe 10 out of 10 she took a Tylenol which resolved the pain but they went to immediate care and they were told that it is probably a TMJ pain.  No EKG was done at that time.  She states that the jaw pain has resolved but she still has some discomfort in her jaw.  She is able to chew the food but if she opens the mouth to right it hurts.  She also states that her asthma is back.  She was in the immediate care back in October for asthma.  She feels like the symptoms has not completely resolved she still gets short of breath.  She is a  and therefore she is around sick students all the time and gets some shortness of breath has been using albuterol and Advair but feels like her chest tightness or shortness of breath is not completely resolved.  Would like to add in something else to her asthma regimen.  Not currently on steroids  Saturday morning pain in the the jaw  Helped tylenol     Current Outpatient Medications   Medication Sig Dispense Refill    tiotropium (SPIRIVA HANDIHALER) 18 MCG Inhalation Cap Inhale 1 capsule (18 mcg total) into the lungs daily. 90 capsule 3    benzonatate 200 MG Oral Cap Take 1 capsule (200 mg total) by mouth 3 (three) times daily as needed for cough. 30 capsule 0     Tacrolimus 0.03 % External Ointment Use daily for 7 days and after that as needed to external auditory canal 30 g 1    fluticasone-salmeterol (ADVAIR DISKUS) 250-50 MCG/ACT Inhalation Aerosol Powder, Breath Activated Inhale 1 puff into the lungs every 12 (twelve) hours. 1 each 1    predniSONE 10 MG Oral Tab Take 30 mg PO daily x 2 days, Take 20 mg PO daily x 2 days, Take 10 mg PO daily x 2 days (Patient not taking: Reported on 8/8/2024) 12 tablet 0    Spacer/Aero-Holding Chambers Does not apply Device Use with albuterol inhaler (Patient not taking: Reported on 8/8/2024) 1 each 0    guaiFENesin-codeine (CHERATUSSIN AC) 100-10 MG/5ML Oral Solution Take 5 mL by mouth every 6 (six) hours as needed for cough. (Patient not taking: Reported on 8/8/2024) 50 mL 0    fluticasone-salmeterol (ADVAIR DISKUS) 500-50 MCG/DOSE Inhalation Aerosol Powder, Breath Activated Inhale 1 puff into the lungs 2 (two) times daily. (Patient not taking: Reported on 5/3/2022) 60 each 0      Past Medical History:    Asthma (HCC)    Back problem    Migraines    Pelvic floor dysfunction    dyasisis; physical therapy    Screen for colon cancer    repeat CLN in 10 years    Separation of abdominal muscles    with pregnancy      Past Surgical History:   Procedure Laterality Date    Cholecystectomy  2013    cholelithiasis    Knee arthroscopy Left 12/21/16    Left knee ACL repair-Dr. Boone      Social History:  Social History     Socioeconomic History    Marital status:    Tobacco Use    Smoking status: Never    Smokeless tobacco: Never   Vaping Use    Vaping status: Never Used   Substance and Sexual Activity    Alcohol use: No    Drug use: No   Other Topics Concern    Caffeine Concern Yes    Exercise No   Social History Narrative    The patient does not use an assistive device..      The patient does live in a home with stairs.      Family History   Problem Relation Age of Onset    Diabetes Mother     Hypertension Mother     Arthritis Mother  72        cervical arthritis    Breast Cancer Mother 70        lumpectomy and radiation    Obesity Father 68    Hypertension Father     Breast Cancer Paternal Grandmother 48    Dementia Paternal Grandfather         Alzheimer's Disease    Skin cancer Paternal Uncle     Prostate Cancer Paternal Uncle         age unknown    Cancer Paternal Uncle         pancreatic      Allergies[1]     REVIEW OF SYSTEMS:   Review of Systems   Review of Systems   Constitutional: Negative for activity change, appetite change and fever.   HENT: Negative for congestion and voice change.    Respiratory: Negative for cough and shortness of breath.    Cardiovascular: Negative for chest pain.   Gastrointestinal: Negative for abdominal distention, abdominal pain and vomiting.   Genitourinary: Negative for hematuria.   Skin: Negative for wound.   Psychiatric/Behavioral: Negative for behavioral problems.   Wt Readings from Last 5 Encounters:   08/08/24 247 lb (112 kg)   02/16/24 242 lb 9.6 oz (110 kg)   07/30/23 230 lb (104.3 kg)   12/30/22 235 lb 3.2 oz (106.7 kg)   06/23/22 242 lb 12.8 oz (110.1 kg)     There is no height or weight on file to calculate BMI.      EXAM:   There were no vitals taken for this visit.  Physical Exam   Limited exam secondary to telehealth visit  Patient appears well  Alert and oriented  No cough during exam   Normal bilateral upper ext movements  No tremor noted  Mood stable     ASSESSMENT AND PLAN:   1. Sprain of temporomandibular joint, initial encounter  - tylenol/ibuprofen as needed  - needs ENT eval   - ENT - INTERNAL    2. Mild intermittent asthma without complication (HCC)  - continue albuterol as needed  - advair bid  - will add spiriva  - Patient has a follow up with pcp in couple weeks.       The patient indicates understanding of these issues and agrees to the plan.  Follow up as scheduled    Linda Gonzales MD        [1] No Known Allergies

## 2025-02-04 ENCOUNTER — OFFICE VISIT (OUTPATIENT)
Dept: INTERNAL MEDICINE CLINIC | Facility: CLINIC | Age: 55
End: 2025-02-04

## 2025-02-04 VITALS
WEIGHT: 248.38 LBS | HEART RATE: 73 BPM | BODY MASS INDEX: 35.56 KG/M2 | HEIGHT: 70 IN | DIASTOLIC BLOOD PRESSURE: 81 MMHG | SYSTOLIC BLOOD PRESSURE: 120 MMHG

## 2025-02-04 DIAGNOSIS — G44.89 OTHER HEADACHE SYNDROME: Primary | ICD-10-CM

## 2025-02-04 DIAGNOSIS — J45.20 MILD INTERMITTENT ASTHMA WITHOUT COMPLICATION (HCC): ICD-10-CM

## 2025-02-04 DIAGNOSIS — R68.84 JAW PAIN: ICD-10-CM

## 2025-02-04 DIAGNOSIS — R29.810 FACIAL DROOP: ICD-10-CM

## 2025-02-04 PROCEDURE — 3008F BODY MASS INDEX DOCD: CPT | Performed by: INTERNAL MEDICINE

## 2025-02-04 PROCEDURE — 3079F DIAST BP 80-89 MM HG: CPT | Performed by: INTERNAL MEDICINE

## 2025-02-04 PROCEDURE — 3074F SYST BP LT 130 MM HG: CPT | Performed by: INTERNAL MEDICINE

## 2025-02-04 PROCEDURE — 99214 OFFICE O/P EST MOD 30 MIN: CPT | Performed by: INTERNAL MEDICINE

## 2025-02-04 RX ORDER — CYCLOBENZAPRINE HCL 10 MG
10 TABLET ORAL NIGHTLY
Qty: 30 TABLET | Refills: 0 | Status: SHIPPED | OUTPATIENT
Start: 2025-02-04

## 2025-02-04 RX ORDER — FLUTICASONE PROPIONATE AND SALMETEROL 250; 50 UG/1; UG/1
1 POWDER RESPIRATORY (INHALATION) EVERY 12 HOURS SCHEDULED
Qty: 1 EACH | Refills: 1 | Status: SHIPPED | OUTPATIENT
Start: 2025-02-04

## 2025-02-05 NOTE — PROGRESS NOTES
Subjective:     Patient ID: Selam Kumar Both is a 54 year old female.  Presents for evaluation of the jaw pain headache    HPI  Reports that about [place] time several weeks ago started to experience left sided jaw pain, radiating to the ear associated with fullness in ear, at that time was seen for COVID treated for COVID first time in her life.  Recovered, jaw pain persist it is not as severe.  Denies pain on eating, denies pain on swallowing.  Lately experiencing more headaches they could be frontal bitemporal.  She used to have migraine headaches but there were quiet for many years and lately experiencing headaches again.  She has seen dentist states that she does not have dental issue.  Denies chest pain or shortness of breath, had flareup of asthma that happens to her once or twice a year and inhaler is usually helpful    Current Outpatient Medications   Medication Sig Dispense Refill    cyclobenzaprine 10 MG Oral Tab Take 1 tablet (10 mg total) by mouth nightly. 30 tablet 0    fluticasone-salmeterol (ADVAIR DISKUS) 250-50 MCG/ACT Inhalation Aerosol Powder, Breath Activated Inhale 1 puff into the lungs every 12 (twelve) hours. 1 each 1    tiotropium (SPIRIVA HANDIHALER) 18 MCG Inhalation Cap Inhale 1 capsule (18 mcg total) into the lungs daily. 90 capsule 3    benzonatate 200 MG Oral Cap Take 1 capsule (200 mg total) by mouth 3 (three) times daily as needed for cough. 30 capsule 0    Tacrolimus 0.03 % External Ointment Use daily for 7 days and after that as needed to external auditory canal 30 g 1    predniSONE 10 MG Oral Tab Take 30 mg PO daily x 2 days, Take 20 mg PO daily x 2 days, Take 10 mg PO daily x 2 days (Patient not taking: Reported on 2/4/2025) 12 tablet 0    Spacer/Aero-Holding Chambers Does not apply Device Use with albuterol inhaler (Patient not taking: Reported on 2/4/2025) 1 each 0    guaiFENesin-codeine (CHERATUSSIN AC) 100-10 MG/5ML Oral Solution Take 5 mL by mouth every 6 (six) hours as  needed for cough. (Patient not taking: Reported on 2/4/2025) 50 mL 0    fluticasone-salmeterol (ADVAIR DISKUS) 500-50 MCG/DOSE Inhalation Aerosol Powder, Breath Activated Inhale 1 puff into the lungs 2 (two) times daily. (Patient not taking: Reported on 2/4/2025) 60 each 0     Allergies:Allergies[1]    Past Medical History:    Asthma (HCC)    Back problem    Migraines    Pelvic floor dysfunction    dyasisis; physical therapy    Screen for colon cancer    repeat CLN in 10 years    Separation of abdominal muscles    with pregnancy      Past Surgical History:   Procedure Laterality Date    Cholecystectomy  2013    cholelithiasis    Knee arthroscopy Left 12/21/16    Left knee ACL repair-Dr. Boone      Family History   Problem Relation Age of Onset    Diabetes Mother     Hypertension Mother     Arthritis Mother 72        cervical arthritis    Breast Cancer Mother 70        lumpectomy and radiation    Obesity Father 68    Hypertension Father     Breast Cancer Paternal Grandmother 48    Dementia Paternal Grandfather         Alzheimer's Disease    Skin cancer Paternal Uncle     Prostate Cancer Paternal Uncle         age unknown    Cancer Paternal Uncle         pancreatic      Social History:   Social History     Socioeconomic History    Marital status:    Tobacco Use    Smoking status: Never     Passive exposure: Never    Smokeless tobacco: Never   Vaping Use    Vaping status: Never Used   Substance and Sexual Activity    Alcohol use: No    Drug use: No   Other Topics Concern    Caffeine Concern Yes    Exercise No   Social History Narrative    The patient does not use an assistive device..      The patient does live in a home with stairs.        /81 (BP Location: Left arm, Patient Position: Sitting, Cuff Size: adult)   Pulse 73   Ht 5' 10\" (1.778 m)   Wt 248 lb 6.4 oz (112.7 kg)   BMI 35.64 kg/m²    Physical Exam  Constitutional:       Appearance: Normal appearance. She is obese.   HENT:      Head:  Normocephalic and atraumatic.   Eyes:      General: No visual field deficit or scleral icterus.     Extraocular Movements: Extraocular movements intact.      Conjunctiva/sclera: Conjunctivae normal.      Pupils: Pupils are equal, round, and reactive to light.   Cardiovascular:      Rate and Rhythm: Normal rate and regular rhythm.   Pulmonary:      Effort: Pulmonary effort is normal. No respiratory distress.      Breath sounds: No wheezing or rhonchi.   Musculoskeletal:         General: Normal range of motion.      Cervical back: Normal range of motion and neck supple.      Right lower leg: No edema.      Left lower leg: No edema.   Lymphadenopathy:      Cervical: No cervical adenopathy.   Skin:     General: Skin is warm.      Coloration: Skin is not jaundiced.   Neurological:      Mental Status: She is alert and oriented to person, place, and time. Mental status is at baseline.      Cranial Nerves: Facial asymmetry present. No dysarthria.      Motor: Motor function is intact. No weakness.      Coordination: Coordination normal.      Gait: Gait normal.      Deep Tendon Reflexes: Reflexes normal.      Reflex Scores:       Tricep reflexes are 2+ on the right side and 2+ on the left side.       Bicep reflexes are 2+ on the right side and 2+ on the left side.       Brachioradialis reflexes are 2+ on the right side and 2+ on the left side.       Patellar reflexes are 2+ on the right side and 2+ on the left side.       Achilles reflexes are 2+ on the right side and 2+ on the left side.     Comments: Right facial droop, noted mild for the right is droopy   Psychiatric:         Mood and Affect: Mood normal.         Behavior: Behavior normal.         Assessment & Plan:   1. Other headache syndrome rule out brain aneurysm versus brain tumor MRI of the brain with and without contrast ordered   2. Facial droop see neurology   3. Jaw pain rule out polymyalgia rheumatica will check sed rate C-reactive protein CBC CMP see  neurology, Eduar element of TMJ, try cyclobenzaprine 10 mg at bedtime, warned about sedation and driving in the morning   4. Mild intermittent asthma without complication (HCC) controlled at present continue Advair 251 inhalation every 12 hours for 3 to 4 months       Orders Placed This Encounter   Procedures    CBC With Differential With Platelet    C-Reactive Protein    Sed Rate, Westergren (Automated)    Comp Metabolic Panel (14)       Meds This Visit:  Requested Prescriptions     Signed Prescriptions Disp Refills    cyclobenzaprine 10 MG Oral Tab 30 tablet 0     Sig: Take 1 tablet (10 mg total) by mouth nightly.    fluticasone-salmeterol (ADVAIR DISKUS) 250-50 MCG/ACT Inhalation Aerosol Powder, Breath Activated 1 each 1     Sig: Inhale 1 puff into the lungs every 12 (twelve) hours.       Imaging & Referrals:  NEURO - INTERNAL  MRI BRAIN (W+WO) (CPT=70553)          [1] No Known Allergies

## 2025-02-24 ENCOUNTER — TELEPHONE (OUTPATIENT)
Dept: INTERNAL MEDICINE CLINIC | Facility: CLINIC | Age: 55
End: 2025-02-24

## 2025-02-24 NOTE — TELEPHONE ENCOUNTER
Marisabel Sánchez for Dr. Christensen. Please see patient Jedt message  below. Patient stated that she did see Dr. Christensen about her jaw pain on 2/4/2025. Per patient if she takes the medication for her jaw pain the pain is a 1-2/10 but she can not continue to take the cyclobenzaprine 10 MG Oral Tab because it is making her extremely drowsy during the day at work. Patient will like to know if there is a different medication that she can take.     February 23, 2025  Selam Kumar Both to P Em Rn Triage (supporting Mell Christensen MD)         2/23/25  4:24 PM  I have been extremely drowsy during the day at work. I am thinking it is the medication that I am taking. I usually take it at 5 or 6:00pm. My work collegue noticed I seemed sleepy or not like my normal self the week I took it. When I took it on Friday (3 days ago), I woke up on Saturday at about 6 am (pretty normal) and then proceeded to be exhausted all day. I took a nap from 11:00-1:00 and again from 2:00-5:00. I then went to bed at about 9:00 on Saturday. I just think the sleepiness of the medication is out weighing the benefits of the jaw pain. My jaw pain is very minimal, sometimes not feeling any pain. I am not taking the medication because it is too difficult at work to be tired.

## 2025-02-24 NOTE — TELEPHONE ENCOUNTER
Spoke with patient, (  Name and date of birth verified ) informed of ADELINE Pacheco   instructions below      She will be having labs done on 2/25  She is trying to make an appointment  with  Rheumatology     Advised to call back if symptoms and/ or condition worsens    Patient verbalizes understanding and agrees with plan.     Future Appointments   Date Time Provider Department Center   3/25/2025  2:40 PM Ghode, Reena, MD [facility]

## 2025-02-24 NOTE — TELEPHONE ENCOUNTER
Pt was advised to complete lab work and MRI as part of her evaluation. She has completed MRI and was advised by her PCP to see oral surgeon, Neurology and Rheumatology. Please advise her to schedule appt with specialists for further evaluation. If cyclobenzaprine is making her drowsy during day, she can switch to taking tylenol or NSAID during day and cyclobenzaprine only at bedtime. However if nighttime dose also is causing drowsiness. Then stop medication and switch to tylenol or ibuprofen. Any new or worsening symptoms, go to ER.

## 2025-02-24 NOTE — TELEPHONE ENCOUNTER
Advised pt to call our office to discuss her jaw pain (see mychart dated 2/23/25).     Please also try to call patient.

## 2025-02-27 ENCOUNTER — LAB ENCOUNTER (OUTPATIENT)
Dept: LAB | Facility: HOSPITAL | Age: 55
End: 2025-02-27
Attending: INTERNAL MEDICINE
Payer: COMMERCIAL

## 2025-02-27 DIAGNOSIS — G44.89 OTHER HEADACHE SYNDROME: ICD-10-CM

## 2025-02-27 DIAGNOSIS — M26.642 ARTHRITIS OF LEFT TEMPOROMANDIBULAR JOINT: ICD-10-CM

## 2025-02-27 LAB
ALBUMIN SERPL-MCNC: 4.9 G/DL (ref 3.2–4.8)
ALBUMIN/GLOB SERPL: 2.1 {RATIO} (ref 1–2)
ALP LIVER SERPL-CCNC: 77 U/L
ALT SERPL-CCNC: 33 U/L
ANION GAP SERPL CALC-SCNC: 8 MMOL/L (ref 0–18)
AST SERPL-CCNC: 19 U/L (ref ?–34)
BASOPHILS # BLD AUTO: 0.04 X10(3) UL (ref 0–0.2)
BASOPHILS NFR BLD AUTO: 0.7 %
BILIRUB SERPL-MCNC: 0.4 MG/DL (ref 0.3–1.2)
BUN BLD-MCNC: 22 MG/DL (ref 9–23)
BUN/CREAT SERPL: 27.5 (ref 10–20)
CALCIUM BLD-MCNC: 9.7 MG/DL (ref 8.7–10.4)
CHLORIDE SERPL-SCNC: 106 MMOL/L (ref 98–112)
CO2 SERPL-SCNC: 27 MMOL/L (ref 21–32)
CREAT BLD-MCNC: 0.8 MG/DL
CRP SERPL-MCNC: 0.7 MG/DL (ref ?–1)
DEPRECATED RDW RBC AUTO: 44.3 FL (ref 35.1–46.3)
EGFRCR SERPLBLD CKD-EPI 2021: 89 ML/MIN/1.73M2 (ref 60–?)
EOSINOPHIL # BLD AUTO: 0.04 X10(3) UL (ref 0–0.7)
EOSINOPHIL NFR BLD AUTO: 0.7 %
ERYTHROCYTE [DISTWIDTH] IN BLOOD BY AUTOMATED COUNT: 13.6 % (ref 11–15)
ERYTHROCYTE [SEDIMENTATION RATE] IN BLOOD: 43 MM/HR
FASTING STATUS PATIENT QL REPORTED: NO
GLOBULIN PLAS-MCNC: 2.3 G/DL (ref 2–3.5)
GLUCOSE BLD-MCNC: 106 MG/DL (ref 70–99)
HCT VFR BLD AUTO: 39.9 %
HGB BLD-MCNC: 13.2 G/DL
IMM GRANULOCYTES # BLD AUTO: 0.01 X10(3) UL (ref 0–1)
IMM GRANULOCYTES NFR BLD: 0.2 %
LYMPHOCYTES # BLD AUTO: 1.84 X10(3) UL (ref 1–4)
LYMPHOCYTES NFR BLD AUTO: 31.2 %
MCH RBC QN AUTO: 29.4 PG (ref 26–34)
MCHC RBC AUTO-ENTMCNC: 33.1 G/DL (ref 31–37)
MCV RBC AUTO: 88.9 FL
MONOCYTES # BLD AUTO: 0.48 X10(3) UL (ref 0.1–1)
MONOCYTES NFR BLD AUTO: 8.1 %
NEUTROPHILS # BLD AUTO: 3.48 X10 (3) UL (ref 1.5–7.7)
NEUTROPHILS # BLD AUTO: 3.48 X10(3) UL (ref 1.5–7.7)
NEUTROPHILS NFR BLD AUTO: 59.1 %
OSMOLALITY SERPL CALC.SUM OF ELEC: 296 MOSM/KG (ref 275–295)
PLATELET # BLD AUTO: 272 10(3)UL (ref 150–450)
POTASSIUM SERPL-SCNC: 3.7 MMOL/L (ref 3.5–5.1)
PROT SERPL-MCNC: 7.2 G/DL (ref 5.7–8.2)
RBC # BLD AUTO: 4.49 X10(6)UL
RHEUMATOID FACT SERPL-ACNC: <3.5 IU/ML (ref ?–14)
SODIUM SERPL-SCNC: 141 MMOL/L (ref 136–145)
WBC # BLD AUTO: 5.9 X10(3) UL (ref 4–11)

## 2025-02-27 PROCEDURE — 36415 COLL VENOUS BLD VENIPUNCTURE: CPT

## 2025-02-27 PROCEDURE — 85652 RBC SED RATE AUTOMATED: CPT

## 2025-02-27 PROCEDURE — 80053 COMPREHEN METABOLIC PANEL: CPT

## 2025-02-27 PROCEDURE — 86140 C-REACTIVE PROTEIN: CPT

## 2025-02-27 PROCEDURE — 86431 RHEUMATOID FACTOR QUANT: CPT

## 2025-02-27 PROCEDURE — 85025 COMPLETE CBC W/AUTO DIFF WBC: CPT

## 2025-03-25 ENCOUNTER — OFFICE VISIT (OUTPATIENT)
Dept: NEUROLOGY | Facility: CLINIC | Age: 55
End: 2025-03-25
Payer: COMMERCIAL

## 2025-03-25 DIAGNOSIS — G43.909 EPISODIC MIGRAINE: Primary | ICD-10-CM

## 2025-03-25 PROCEDURE — G2211 COMPLEX E/M VISIT ADD ON: HCPCS | Performed by: INTERNAL MEDICINE

## 2025-03-25 PROCEDURE — 99203 OFFICE O/P NEW LOW 30 MIN: CPT | Performed by: INTERNAL MEDICINE

## 2025-03-25 RX ORDER — ALBUTEROL SULFATE 90 UG/1
2 INHALANT RESPIRATORY (INHALATION) EVERY 4 HOURS PRN
COMMUNITY
Start: 2024-10-27

## 2025-03-25 RX ORDER — ACETAMINOPHEN AND CODEINE PHOSPHATE 300; 30 MG/1; MG/1
1-2 TABLET ORAL EVERY 6 HOURS PRN
COMMUNITY
Start: 2024-11-26

## 2025-03-25 RX ORDER — NIRMATRELVIR AND RITONAVIR 300-100 MG
1 KIT ORAL AS DIRECTED
COMMUNITY
Start: 2025-01-11

## 2025-03-25 RX ORDER — ERYTHROMYCIN 5 MG/G
OINTMENT OPHTHALMIC
COMMUNITY
Start: 2024-11-26

## 2025-03-25 NOTE — PROGRESS NOTES
[address], [address]  [facility] [address]  [phone]            Neurology Initial Visit     Referred By: Dr. Grant ref. provider found    Chief Complaint:   Chief Complaint   Patient presents with    Neurologic Problem     Patient presents here today to establish care, patient was referred by Mell Christensen MD to evaluate and treat Other headache syndrome. Patient c/o left jaw pain that causes headaches/migraine, since November 2024. Denies dizziness, nausea. Had an MRI, Labs and Dental visit with all being normal.   Current medications acetaminophen-codeine 300-30 MG.   Patient gives verbal consent to use Abridge.      History of Present Illness  Selam Low is a 55 year old female with migraines who presents with jaw pain and increased migraine frequency.    She has a long-standing history of migraines, which began approximately 16-17 years ago. The migraines occur in episodic patterns with periods of increased frequency followed by remission. Recently, she has experienced more frequent migraines, with up to 2 episodes per week during bad months. Early intervention with over-the-counter medications like Excedrin is effective, typically resolving headaches within 35 minutes to an hour. If not treated early, migraines can lead to nausea and vomiting. No recent headaches in the past week, but when she experiences them, they can lead to nausea and a sensation of impending vomiting.    In October or November, she began experiencing significant left jaw pain, which has persisted as a low-grade, constant discomfort. In January, she experienced an episode of excruciating jaw pain rated as 10 out of 10, prompting further medical evaluation. An MRI indicated significant temporomandibular joint involvement. The dentist found no evidence of teeth grinding. She was initially prescribed a muscle relaxant, which she discontinued due to side effects such as  sleepiness and feeling 'off'. She now occasionally uses over-the-counter medications like ibuprofen for pain management.    Her menstrual cycle has been irregular for the past two years, with a history of extremely heavy periods around the age of 52, but she is no longer menstruating. She reports fragmented sleep, often waking early, and naps during the day. Her  uses a CPAP machine, and she has started to snore, leading her to sleep in a separate room.    She has a history of asthma, which presents with a cough, and there was a consideration that the jaw pain might be related to a pulled muscle from coughing. However, the jaw pain has persisted independently of her asthma symptoms.        Past Medical History:    Asthma (HCC)    Back problem    Migraines    Pelvic floor dysfunction    dyasisis; physical therapy    Screen for colon cancer    repeat CLN in 10 years    Separation of abdominal muscles    with pregnancy       Past Surgical History:   Procedure Laterality Date    Cholecystectomy  2013    cholelithiasis    Knee arthroscopy Left 12/21/16    Left knee ACL repair-Dr. Boone       Social history:  History   Smoking Status    Never   Smokeless Tobacco    Never     History   Alcohol Use No     History   Drug Use No       Family History   Problem Relation Age of Onset    Diabetes Mother     Hypertension Mother     Arthritis Mother 72        cervical arthritis    Breast Cancer Mother 70        lumpectomy and radiation    Obesity Father 68    Hypertension Father     Breast Cancer Paternal Grandmother 48    Dementia Paternal Grandfather         Alzheimer's Disease    Skin cancer Paternal Uncle     Prostate Cancer Paternal Uncle         age unknown    Cancer Paternal Uncle         pancreatic         Current Outpatient Medications:     acetaminophen-codeine 300-30 MG Oral Tab, Take 1-2 tablets by mouth every 6 (six) hours as needed., Disp: , Rfl:     albuterol 108 (90 Base) MCG/ACT Inhalation Aero Soln,  Inhale 2 puffs into the lungs every 4 (four) hours as needed., Disp: , Rfl:     amoxicillin clavulanate 875-125 MG Oral Tab, Take 1 tablet by mouth 2 (two) times daily., Disp: , Rfl:     erythromycin 5 MG/GM Ophthalmic Ointment, Apply to surgical eyelid or eye BID for 14 days, Disp: , Rfl:     PAXLOVID, 300/100, 20 x 150 MG & 10 x 100MG Oral Tablet Therapy Pack, Take 1 tablet by mouth As Directed., Disp: , Rfl:     cyclobenzaprine 10 MG Oral Tab, Take 1 tablet (10 mg total) by mouth nightly., Disp: 30 tablet, Rfl: 0    fluticasone-salmeterol (ADVAIR DISKUS) 250-50 MCG/ACT Inhalation Aerosol Powder, Breath Activated, Inhale 1 puff into the lungs every 12 (twelve) hours., Disp: 1 each, Rfl: 1    tiotropium (SPIRIVA HANDIHALER) 18 MCG Inhalation Cap, Inhale 1 capsule (18 mcg total) into the lungs daily., Disp: 90 capsule, Rfl: 3    benzonatate 200 MG Oral Cap, Take 1 capsule (200 mg total) by mouth 3 (three) times daily as needed for cough., Disp: 30 capsule, Rfl: 0    Tacrolimus 0.03 % External Ointment, Use daily for 7 days and after that as needed to external auditory canal, Disp: 30 g, Rfl: 1    predniSONE 10 MG Oral Tab, Take 30 mg PO daily x 2 days, Take 20 mg PO daily x 2 days, Take 10 mg PO daily x 2 days (Patient not taking: Reported on 2/4/2025), Disp: 12 tablet, Rfl: 0    Spacer/Aero-Holding Chambers Does not apply Device, Use with albuterol inhaler (Patient not taking: Reported on 2/4/2025), Disp: 1 each, Rfl: 0    guaiFENesin-codeine (CHERATUSSIN AC) 100-10 MG/5ML Oral Solution, Take 5 mL by mouth every 6 (six) hours as needed for cough. (Patient not taking: Reported on 2/4/2025), Disp: 50 mL, Rfl: 0    fluticasone-salmeterol (ADVAIR DISKUS) 500-50 MCG/DOSE Inhalation Aerosol Powder, Breath Activated, Inhale 1 puff into the lungs 2 (two) times daily. (Patient not taking: Reported on 2/4/2025), Disp: 60 each, Rfl: 0    Allergies[1]    ROS:   As in HPI, the rest of the 14 system review was done and was  negative      Physical Exam:  There were no vitals filed for this visit.    General: No apparent distress, well nourished, well groomed.  Head- Normocephalic, atraumatic  Eyes- No redness or swelling    Neurological:   Mental Status:  Mental Status- Alert, conversant, speech fluent, following all commands    Cranial Nerves:  II.- Visual fields full to confrontation,       Fundoscopic Exam- Sharp optic discs, no pallor  III, IV, VI- EOM intact  VII. At times lower face appears asymmetric, but has no facial weakness with eye closure, eyebrow raise, and no nasolabial fold flattening.     Motor Exam:  Strength- upper extremities 5/5 proximally and distally                - lower  extremities 5/5 proximally and distally    Sensory Exam:  Pinprick- intact in all 4 extremities  Vibration- intact in all 4 extremities    Deep Tendon Reflexes:  1+ throughout  2+ ankle jerks    Coordination:  No dysmetria with finger to nose bilaterally    Gait:  Normal casual gait    Labs:    Lab Results   Component Value Date    TSH 1.040 05/25/2019     Lab Results   Component Value Date    HDL 46 05/25/2019    LDL 85 05/25/2019    TRIG 74 05/25/2019     Lab Results   Component Value Date    HGB 13.2 02/27/2025    HCT 39.9 02/27/2025    MCV 88.9 02/27/2025    WBC 5.9 02/27/2025    .0 02/27/2025      Lab Results   Component Value Date    BUN 22 02/27/2025    CA 9.7 02/27/2025    ALT 33 02/27/2025    AST 19 02/27/2025    ALKPHOS 54 01/10/2015    ALB 4.9 (H) 02/27/2025     02/27/2025    K 3.7 02/27/2025     02/27/2025    CO2 27.0 02/27/2025      I have reviewed labs.    Imaging Studies:  I have independently reviewed imaging.  MRI brain appears normal but does show inflammation in left TM joint    Assessment & Plan  Temporomandibular Joint Disorder (TMJ)  Left jaw pain likely due to TMJ, MRI shows inflammation of left TM joint.  - Recommend NSAIDs like Naproxen to reduce inflammation, take with food or at bedtime.  -  Coordinate with Dr. Christensen for ongoing management.    Episodic Migraine  16-year history of migraines with recent frequency increase, likely due to perimenopause. Normal MRI. Effective early treatment with Excedrin.   - Continue current management with over-the-counter migraine medications.  - Monitor frequency and severity; consider prescription options if frequency increases.  - Return for follow-up if migraines worsen.    Facial asymmetry  -Symptoms face appears asymmetric but all facial movements and strength assessment is normal on exam  -MRI brain reviewed, shows no cause for facial asymmetry  -Likely normal finding      Education and counseling provided to patient. Instructed patient to call my office or seek medical attention immediately if symptoms worsen.  Patient verbalized understanding of information given. All questions were answered. All side effects of drugs were discussed.     I have spent 20 min total time on the day of the encounter, including: Total time spent reasons:  Preparing to see the patient, Obtaining and/or reviewing separately obtained history, Performing a medically appropriate examination and/or evaluation, Counseling and educating the patient/family/caregiver, Documenting clinical information in Epic, and Independently interpreting results and communicating results to the patient/family/caregiver      Return to clinic in: Return if symptoms worsen or fail to improve.    Nevin Burrows MD         [1] No Known Allergies

## (undated) NOTE — LETTER
12/28/20        Chanell Reyna      Dear Mihir Bender records indicate that you have outstanding lab work and or testing that was ordered for you and has not yet been completed:  Orders Placed This Encounter

## (undated) NOTE — LETTER
12/14/2020              Bautista Moder Both        [address]         Please excuse patient fro      Sincerely,    Heidy Menendez MD  [place] [address], [place] [address], [address] [place] [address]  [place] [address] [phone]  3

## (undated) NOTE — MR AVS SNAPSHOT
[address]  150 [facility] [address]  [phone]               Thank you for choosing us for your health care visit with Tenzin Love PT.   We are glad to serve you and happy to provide you with this s [facility] in [facility] [address] ([address] [facility])    150 [facility] [phone]           Please arrive at your scheduled appointment time. Wear comfortable, loose fitting clothing.             Apr 06, 2017  6:15 PM

## (undated) NOTE — MR AVS SNAPSHOT
[address]  150 [facility] [address]  [phone]  [phone]               Thank you for choosing us for your health care visit with Jessa Marks PT.   We are glad to serve you and happy to provide you with this s [facility] in [address] [place] [address] ([address])    150 [facility] [phone]           Please arrive at your scheduled appointment time. Wear comfortable, loose fitting clothing.             Apr 10, 2017  6:15 PM

## (undated) NOTE — MR AVS SNAPSHOT
[address]  150 [facility] [address]  [phone]  [phone]               Thank you for choosing us for your health care visit with Savana Del Angel PT.   We are glad to serve you and happy to provide you with this s appointment is no longer needed, please contact your physician office to cancel.   Please verify with your primary care provider if your insurance requires a referral.            Jan 26, 2017  6:15 PM   [place] [facility] Visit By Therapist with Ashkan Xie office, you can view your past visit information in ProPlan by going to Visits < Visit Summaries. ProPlan questions? Call [phone] for help. ProPlan is NOT to be used for urgent needs. For medical emergencies, dial 911.

## (undated) NOTE — MR AVS SNAPSHOT
[address]  150 [facility] [address]  [phone]  [phone]               Thank you for choosing us for your health care visit with Kathe Sow PT.   We are glad to serve you and happy to provide you with this s Ben in [facility] [address] ([address] [facility])    150 [facility] [phone]           Please arrive at your scheduled appointment time. Wear comfortable, loose fitting clothing.             Feb 27, 2017  6:15 PM

## (undated) NOTE — MR AVS SNAPSHOT
[facility] SPECIALTY [facility] [place] [address] [address] [phone]               Thank you for choosing us for your health care visit with Manpreet Mora MD.  We are glad to serve you and happy to provide you with this summary of yo [phone]           Please arrive 15 minutes prior to your scheduled appointment. Please also bring your Insurance card, Photo ID, and your medication bottles or a list of your current medication.   If your condition improves and this appointment is no l These medications were sent to [address] [phone], [phone]  110 [facility], [place] [address] [id number]     Phone:  [phone]    - methocarbamol 750 MG ? Keep objects that you use often within easy reach. BATHROOM:  ? Install grab bars on the bathroom walls beside the tub, shower and toilet. ? Use a non skid rubber mat in the tub/shower.   ? If you are unsteady on your feet you may want to use a shower c

## (undated) NOTE — MR AVS SNAPSHOT
[address]  150 [facility] [address]  [phone]  [phone]               Thank you for choosing us for your health care visit with Kyra Shetty PT.   We are glad to serve you and happy to provide you with this s Ben in [facility] [address] ([address] [facility],[address])    150 [facility] [phone]           Please arrive at your scheduled appointment time. Wear comfortable, loose fitting clothing.             Jun 22, 2017  6:15 PM

## (undated) NOTE — LETTER
[address]   Date:   5/12/2021     Name:   Ed Low    YOB: 1970   MRN:   [id number]       WHERE IS YOUR PAIN NOW? Tripp the areas on your body where you feel the described sensations.   Use the appropriate

## (undated) NOTE — MR AVS SNAPSHOT
[address]  150 Located within [facility] [address]  [phone]  [phone]               Thank you for choosing us for your health care visit with Tenzin Love PT.   We are glad to serve you and happy to provide you with this s [facility] [facility] in [place] ([address] [facility],[address])    150 [facility] [phone]           Please arrive at your scheduled appointment time. Wear comfortable, loose fitting clothing.               [address] [facility]

## (undated) NOTE — MR AVS SNAPSHOT
[address]  150 [facility] [address]  [phone]  [phone]               Thank you for choosing us for your health care visit with Ji Jerez PT.   We are glad to serve you and happy to provide you with this s bring your Insurance card, Photo ID, and your medication bottles or a list of your current medication. If your condition improves and this appointment is no longer needed, please contact your physician office to cancel.   Please verify with your primary ca 3. In 4 weeks, pt will demo at least 50% improvement in L knee AROM to promote normalized gait mechanics    4. In 8 weeks, pt will demo improved L knee AROM to 0deg ext and 130deg flex to be able to squat with min to no discomfort for RTW  5.  In 8 weeks, p

## (undated) NOTE — MR AVS SNAPSHOT
[address]  150 [facility] [address]  [phone]  [phone]               Thank you for choosing us for your health care visit with Ji Jerez PT.   We are glad to serve you and happy to provide you with this s Ben in [facility] 402 ([address] [facility],[address])    150 [facility] [phone]           Please arrive at your scheduled appointment time. Wear comfortable, loose fitting clothing.             May 18, 2017  6:15 PM

## (undated) NOTE — MR AVS SNAPSHOT
[address]  150 [facility] [address]  [phone]  [phone]               Thank you for choosing us for your health care visit with Elizabeth Flowers PT.   We are glad to serve you and happy to provide you with this s [facility] [facility] in [facility] [address] ([address])    150 [facility] [phone]           Please arrive at your scheduled appointment time. Wear comfortable, loose fitting clothing.             Mar 23, 2017  6:15 PM

## (undated) NOTE — MR AVS SNAPSHOT
[address]  150 [facility] [address]  [phone]  [phone]               Thank you for choosing us for your health care visit with Debroah Mohs, PT.   We are glad to serve you and happy to provide you with this s [facility] in [address] [place] [address] ([address])    150 [facility] [phone]           Please arrive at your scheduled appointment time. Wear comfortable, loose fitting clothing.             Feb 16, 2017  5:30 PM

## (undated) NOTE — MR AVS SNAPSHOT
[address]  150 [facility] [phone]               Thank you for choosing us for your health care visit with Catrachito Machado PTA.   We are glad to serve you and happy to provide you with this summ Ben in [facility] [address] ([address] [facility],[address])    150 [facility] [phone]           Please arrive at your scheduled appointment time. Wear comfortable, loose fitting clothing.             Jun 01, 2017  6:15 PM

## (undated) NOTE — LETTER
09/05/19    Kike Cohn Both  One [address] Reyna      Dear Sissy Gomez records indicate that you have outstanding lab work and or testing that was ordered for you and has not yet been completed:  Orders Placed This Encounter          X

## (undated) NOTE — MR AVS SNAPSHOT
[address]  150 [facility] [address]  [phone]  [phone]               Thank you for choosing us for your health care visit with Miah Freitas PT.   We are glad to serve you and happy to provide you with this s Ben in [facility] 402 ([address] [facility],[address])    150 [facility] [phone]           Please arrive at your scheduled appointment time. Wear comfortable, loose fitting clothing.             Mar 21, 2017  6:15 PM

## (undated) NOTE — MR AVS SNAPSHOT
[address]  150 [facility] [phone]               Thank you for choosing us for your health care visit with Toni Foster PTA.   We are glad to serve you and happy to provide you with this summ Ben in [facility] 402 ([address] [facility])    150 [facility] [phone]           Please arrive at your scheduled appointment time. Wear comfortable, loose fitting clothing.             Mar 27, 2017  6:15 PM

## (undated) NOTE — MR AVS SNAPSHOT
[address]  150 [facility] [phone]               Thank you for choosing us for your health care visit with Maddy Mcduffie PTA.   We are glad to serve you and happy to provide you with this summ Ben in [facility] 402 ([address] [facility],[address])    150 [facility] [phone]           Please arrive at your scheduled appointment time. Wear comfortable, loose fitting clothing.               Allergies as of Jan 0 6. In 8 weeks, pt will demo SLS at least 10\" on L without brace for improved stability with stance phase of ambulation          MyChart     Visit MyChart  You can access your MyChart to more actively manage your health care and view more details from this

## (undated) NOTE — MR AVS SNAPSHOT
[address]  150 [facility] [address]  [phone]  [phone]               Thank you for choosing us for your health care visit with Alexus Hillman PT.   We are glad to serve you and happy to provide you with this s [facility] in [place] ([address] [facility])    150 [facility] [phone]           Please arrive at your scheduled appointment time. Wear comfortable, loose fitting clothing.             Jun 08, 2017  6:15 PM

## (undated) NOTE — MR AVS SNAPSHOT
[address]  150 [facility] [address]  [phone]  [phone]               Thank you for choosing us for your health care visit with Naga Finley PT.   We are glad to serve you and happy to provide you with this s [facility] in [address] [place] [address] ([address])    150 [facility] [phone]           Please arrive at your scheduled appointment time. Wear comfortable, loose fitting clothing.             Feb 07, 2017  6:15 PM

## (undated) NOTE — MR AVS SNAPSHOT
[address]  150 [facility] [address]  [phone]  [phone]               Thank you for choosing us for your health care visit with Elizabeth Flowers PT.   We are glad to serve you and happy to provide you with this s [address] [facility] in [facility] [address] ([address])    150 [facility] [phone]           Please arrive at your scheduled appointment time. Wear comfortable, loose fitting clothing.             Feb 14, 2017  6:15 PM

## (undated) NOTE — MR AVS SNAPSHOT
[address]  150 [facility] [address]  [phone]  [phone]               Thank you for choosing us for your health care visit with Cutr Blake PT.   We are glad to serve you and happy to provide you with this s Ben in [facility] [place] [address] ([address] [facility],[address])    150 [facility] [phone]           Please arrive at your scheduled appointment time. Wear comfortable, loose fitting clothing.             Feb 20, 2017  6:15 PM

## (undated) NOTE — ED AVS SNAPSHOT
Arlyn Fam Both   MRN: [id number]    Department:  [facility] Emergency Department   Date of Visit:  5/29/2018           Disclosure     Insurance plans vary and the physician(s) referred by the ER may not be covered by your plan.  Please contact CARE PHYSICIAN AT ONCE OR RETURN IMMEDIATELY TO THE EMERGENCY DEPARTMENT. If you have been prescribed any medication(s), please fill your prescription right away and begin taking the medication(s) as directed.   If you believe that any of the medications

## (undated) NOTE — MR AVS SNAPSHOT
[address]  150 [facility] [phone]               Thank you for choosing us for your health care visit with Dave Brar PTA.   We are glad to serve you and happy to provide you with this summ [address] [facility] [address] [phone]           Please arrive 15 minutes prior to your scheduled appointment. Please also bring your Insurance card, Photo ID, and your medication bottles or a list of your current medication.   If your condition improves and Call [phone] for help. Nomesiahart is NOT to be used for urgent needs. For medical emergencies, dial 911.

## (undated) NOTE — MR AVS SNAPSHOT
[address]  150 [facility] [phone]               Thank you for choosing us for your health care visit with Juan R Lockwood PTA.   We are glad to serve you and happy to provide you with this summ [facility] [facility] Visit By Therapist with Daniel Davidson 74 in [place] ([address] [facility],[address])    [address] [place] [address] [phone]           Please arrive at your scheduled appointment time. promote normalized gait mechanics    4. In 8 weeks, pt will demo improved L knee AROM to 0deg ext and 130deg flex to be able to squat with min to no discomfort for RTW  5.  In 8 weeks, pt will demo improved LLE strength to at least 4/5 for all deficit motio

## (undated) NOTE — MR AVS SNAPSHOT
[place]  [address]  [phone]               Thank you for choosing us for your health care visit with Mariposa Pacheco MD.  We are glad to serve you and happy to provide you with this butler Feb 07, 2017  6:15 PM   [place] [facility] By Therapist with Sergio Galicia PTA   [facility] in [address] [place] [address] ([address] [facility])    [address] [address]   [phone]           Please arrive at your schedu to facilitate transition out of brace-IMPROVED  3. In 4 weeks, pt will demo at least 50% improvement in L knee AROM to promote normalized gait mechanics-MET    4.  In 8 weeks, pt will demo improved L knee AROM to 0deg ext and 130deg flex to be able to squat

## (undated) NOTE — MR AVS SNAPSHOT
[address]  150 [facility] [address]  [phone]  [phone]               Thank you for choosing us for your health care visit with Naga Finley PT.   We are glad to serve you and happy to provide you with this s [facility] in [facility] ([address])    150 [facility] [phone]           Please arrive at your scheduled appointment time. Wear comfortable, loose fitting clothing.             Mar 16, 2017  6:15 PM

## (undated) NOTE — MR AVS SNAPSHOT
[address]  150 [facility] [address]  [phone]  [phone]               Thank you for choosing us for your health care visit with Savana Del Angel PT.   We are glad to serve you and happy to provide you with this s [facility] SURGICAL AND CARDIOVASCULAR [facility] in [facility] [address] ([address] [facility])    150 [facility] [phone]           Please arrive at your scheduled appointment time. Wear comfortable, loose fitting clothing.             Jeffry 15, 2017  6:15 PM

## (undated) NOTE — MR AVS SNAPSHOT
[address]  150 [facility] [phone]               Thank you for choosing us for your health care visit with Catrachito Cantrell PTA.   We are glad to serve you and happy to provide you with this summ Jan 18, 2017  4:45 PM   [place] [facility] Visit By Therapist with Torey Henry PTA   [facility] in [address] [place] [address] ([address] [facility],[address])    [address] [address] [address]   [phone]           Please arrive at your s 5. In 8 weeks, pt will demo improved LLE strength to at least 4/5 for all deficit motions to be able to ascend/descend stairs reciprocally  6.  In 8 weeks, pt will demo SLS at least 10\" on L without brace for improved stability with stance phase of ambulat

## (undated) NOTE — MR AVS SNAPSHOT
[address]  150 [facility] [phone]               Thank you for choosing us for your health care visit with Omar Burnette PTA.   We are glad to serve you and happy to provide you with this summ [facility] in [address] [place] [address] ([address])    150 [facility] [phone]           Please arrive at your scheduled appointment time. Wear comfortable, loose fitting clothing.             May 25, 2017  6:15 PM

## (undated) NOTE — MR AVS SNAPSHOT
[address]  150 [facility] [address]  [phone]  [phone]               Thank you for choosing us for your health care visit with Sy Feliz PT.   We are glad to serve you and happy to provide you with this s [facility] in [address] [place] [address] ([address])    150 [facility] [phone]           Please arrive at your scheduled appointment time. Wear comfortable, loose fitting clothing.             Mar 02, 2017  6:15 PM

## (undated) NOTE — MR AVS SNAPSHOT
[address]  150 [facility] [address]  [phone]  [phone]               Thank you for choosing us for your health care visit with Nate Tavera PT.   We are glad to serve you and happy to provide you with this s [facility], [address] [facility] [place]  ([place])    [address]   [phone]            Apr 13, 2017 11:30 AM   [place] [facility] Visit By Therapist with Florecita Bradford

## (undated) NOTE — MR AVS SNAPSHOT
[address]  150 [facility] [address]  [phone]  [phone]               Thank you for choosing us for your health care visit with Savana Del Angel PT.   We are glad to serve you and happy to provide you with this s [facility] 402 ([address] [facility])    150 [facility] [phone]           Please arrive at your scheduled appointment time. Wear comfortable, loose fitting clothing.             Apr 03, 2017  5:30 PM

## (undated) NOTE — Clinical Note
Dear Lázaro Heller,    Thank you for sending Arsh Escobedo Maranda to see me for physiatry consultation. I appreciate your confidence in me to care for your patients. Please feel free call me with any questions at [phone] or contact me through [address] [place] [address] Elfego Sullivan

## (undated) NOTE — LETTER
09/15/20        Kike Cohn Both  [address]      Dear Sissy Gomez records indicate that you have outstanding lab work and or testing that was ordered for you and has not yet been completed:  Orders Placed This Encounter

## (undated) NOTE — MR AVS SNAPSHOT
[address]  150 [facility] [address]  [phone]               Thank you for choosing us for your health care visit with Gerson Max PT.   We are glad to serve you and happy to provide you with this s [place] in [facility] [address] ([address] [facility],[address])    150 [facility] [phone]           Please arrive at your scheduled appointment time. Wear comfortable, loose fitting clothing.             Mar 08, 2017  3:15 PM

## (undated) NOTE — Clinical Note
6/22/2017              [address]         To Whom It May Concern,    Brianna Romero Both is prescribed to take :    Norgestim-Eth Estrad Triphasic (Tab) 0.18/0.215/0.25 MG-35 MCG  TAKE 1 TABLET BY MOUTH MORENO

## (undated) NOTE — MR AVS SNAPSHOT
[address]  150 [facility] [phone]               Thank you for choosing us for your health care visit with Merlin Uriarte PTA.   We are glad to serve you and happy to provide you with this summ [phone]           Please arrive at your scheduled appointment time. Wear comfortable, loose fitting clothing.             Jan 18, 2017  4:45 PM   [place] [facility] Visit By Therapist with Jone Moise, [address] in [facility] 3. In 4 weeks, pt will demo at least 50% improvement in L knee AROM to promote normalized gait mechanics    4. In 8 weeks, pt will demo improved L knee AROM to 0deg ext and 130deg flex to be able to squat with min to no discomfort for RTW  5.  In 8 weeks, p

## (undated) NOTE — MR AVS SNAPSHOT
[address]  150 [facility] [address]  [phone]  [phone]               Thank you for choosing us for your health care visit with Curt Blake PT.   We are glad to serve you and happy to provide you with this s Please arrive at your scheduled appointment time. Wear comfortable, loose fitting clothing.             May 04, 2017  6:15 PM   [place] [facility] Visit By Therapist with FABRIZIO [facility] in [facility]

## (undated) NOTE — MR AVS SNAPSHOT
[address]  150 [facility] [phone]               Thank you for choosing us for your health care visit with Umang Ragsdale PTA.   We are glad to serve you and happy to provide you with this summ [facility] in [address] [place] [address] (8[address] [facility],[address])    [address] [place] [address]           Please arrive at your scheduled appointment time. Wear comfortable, loose fitting clothing.             Feb 23, 2017  6:15 PM

## (undated) NOTE — LETTER
ASTHMA ACTION PLAN for Raysa Wood Both     : 3/1/1970     Date: 20  Doctor:  Cholo Nolan MD  Phone for doctor or clinic: Ynes Sanchez , [address] [facility], [address] [place] [address] [phone]  [facility] 3. Red - Stop!  Danger! <50% Personal Best Peak Flow  Continue Controller Medications But ADD:   Medicine not helping  Breathing is hard and fast  Nose opens wide  Can't walk  Ribs show  Can't talk well Medicine How much to take When to take it    If your s

## (undated) NOTE — LETTER
03/29/21        Dalia Albino Both  [address]      Dear Ina Flanagan,    15[address] [facility] records indicate that you have outstanding lab work and or testing that was ordered for you and has not yet been completed:  Orders Placed This Encounter

## (undated) NOTE — MR AVS SNAPSHOT
[address]  150 [facility] [phone]               Thank you for choosing us for your health care visit with Catrachito Machado PTA.   We are glad to serve you and happy to provide you with this summ [facility] SURGICAL AND CARDIOVASCULAR [facility] in [facility] [address] ([address])    150 [facility] [phone]           Please arrive at your scheduled appointment time. Wear comfortable, loose fitting clothing.             Mar 30, 2017  6:15 PM

## (undated) NOTE — LETTER
09/07/18        [address] [facility]      Dear Imani Cooney,    [address] [facility] records indicate that you have outstanding lab work and or testing that was ordered for you and has not yet been completed:          CBC W Differential W Platelet

## (undated) NOTE — LETTER
09/05/19    Leilani Parrish Both  [address]      Dear Almita Ragland records indicate that you have outstanding lab work and or testing that was ordered for you and has not yet been completed:  Orders Placed This Encounter        MRI

## (undated) NOTE — MR AVS SNAPSHOT
[place]  [address]  [phone]               Thank you for choosing us for your health care visit with Adolfo Queen MD.  We are glad to serve you and happy to provide you with this butler loose fitting clothing.             May 25, 2017  6:15 PM   [place] [facility] Visit By Therapist with Bret Rodriguez, PT   [facility] in [address] [place] [address] ([address] [facility])    [address]   [phone] ACL functional knee brace    Functional Status questions complete?:      Assoc Dx:   Left ACL tear [S83.512A], Acute medial meniscus tear, left, initial encounter [S83.242A], Acute lateral meniscus tear of left knee, initial encounter [S83.282A]          R 4. In 8 weeks, pt will demo improved L knee AROM to 0deg ext and 130deg flex to be able to squat with min to no discomfort for RTW-partially met  5.  In 8 weeks, pt will demo improved LLE strength to at least 4/5 for all deficit motions to be able to ascend active are less likely to develop some chronic diseases than adults who are inactive.      HOW TO GET STARTED: HOW TO STAY MOTIVATED:   Start activities slowly and build up over time Do what you like   Get your heart pumping – brisk walking, biking, swimmin